# Patient Record
Sex: MALE | Race: WHITE | Employment: OTHER | URBAN - METROPOLITAN AREA
[De-identification: names, ages, dates, MRNs, and addresses within clinical notes are randomized per-mention and may not be internally consistent; named-entity substitution may affect disease eponyms.]

---

## 2017-02-17 ENCOUNTER — DOCUMENTATION ONLY (OUTPATIENT)
Dept: VASCULAR SURGERY | Facility: CLINIC | Age: 67
End: 2017-02-17

## 2017-06-05 ENCOUNTER — TELEPHONE (OUTPATIENT)
Dept: FAMILY MEDICINE | Facility: CLINIC | Age: 67
End: 2017-06-05

## 2017-06-05 DIAGNOSIS — J44.9 CHRONIC OBSTRUCTIVE PULMONARY DISEASE, UNSPECIFIED COPD TYPE (H): ICD-10-CM

## 2017-06-05 NOTE — TELEPHONE ENCOUNTER
[facility] Family Medicine phone call message- general phone call:    Reason for call: Patient needs new RX for a nebulizer machine. Please advise.    Return call needed: Yes    OK to leave a message on voice mail? Yes    Primary language: English      needed? No    Call taken on June 5, 2017 at 10:08 AM by Lisest Turcios

## 2017-06-12 ENCOUNTER — MEDICAL CORRESPONDENCE (OUTPATIENT)
Dept: HEALTH INFORMATION MANAGEMENT | Facility: CLINIC | Age: 67
End: 2017-06-12

## 2017-07-07 DIAGNOSIS — J44.1 COPD EXACERBATION (H): ICD-10-CM

## 2017-07-11 DIAGNOSIS — J44.1 COPD EXACERBATION (H): ICD-10-CM

## 2017-07-11 RX ORDER — PREDNISONE 20 MG/1
40 TABLET ORAL DAILY
Qty: 20 TABLET | Refills: 1 | Status: SHIPPED | OUTPATIENT
Start: 2017-07-11 | End: 2020-01-23

## 2017-07-13 RX ORDER — PREDNISONE 20 MG/1
40 TABLET ORAL DAILY
Qty: 20 TABLET | Refills: 3 | Status: SHIPPED | OUTPATIENT
Start: 2017-07-13 | End: 2018-08-15

## 2017-07-26 DIAGNOSIS — J43.9 PULMONARY EMPHYSEMA, UNSPECIFIED EMPHYSEMA TYPE (H): ICD-10-CM

## 2017-07-26 RX ORDER — IPRATROPIUM BROMIDE AND ALBUTEROL SULFATE 2.5; .5 MG/3ML; MG/3ML
3 SOLUTION RESPIRATORY (INHALATION) EVERY 4 HOURS PRN
Qty: 360 ML | Refills: 11 | Status: SHIPPED | OUTPATIENT
Start: 2017-07-26 | End: 2018-07-16

## 2017-09-06 DIAGNOSIS — J44.9 CHRONIC OBSTRUCTIVE PULMONARY DISEASE, UNSPECIFIED COPD TYPE (H): ICD-10-CM

## 2017-09-06 RX ORDER — PREDNISONE 10 MG/1
10 TABLET ORAL DAILY
Qty: 90 TABLET | Refills: 0 | Status: SHIPPED | OUTPATIENT
Start: 2017-09-06 | End: 2017-12-04

## 2017-09-29 ENCOUNTER — OFFICE VISIT (OUTPATIENT)
Dept: FAMILY MEDICINE | Facility: CLINIC | Age: 67
End: 2017-09-29

## 2017-09-29 VITALS
TEMPERATURE: 97.5 F | DIASTOLIC BLOOD PRESSURE: 78 MMHG | OXYGEN SATURATION: 97 % | BODY MASS INDEX: 27.59 KG/M2 | SYSTOLIC BLOOD PRESSURE: 155 MMHG | HEART RATE: 98 BPM | WEIGHT: 186.8 LBS

## 2017-09-29 DIAGNOSIS — I45.81 LONG QT SYNDROME CAUSED BY DRUG: Primary | ICD-10-CM

## 2017-09-29 DIAGNOSIS — J44.9 CHRONIC OBSTRUCTIVE PULMONARY DISEASE, UNSPECIFIED COPD TYPE (H): ICD-10-CM

## 2017-09-29 DIAGNOSIS — T50.905A LONG QT SYNDROME CAUSED BY DRUG: Primary | ICD-10-CM

## 2017-09-29 RX ORDER — AZITHROMYCIN 250 MG/1
TABLET, FILM COATED ORAL
Qty: 30 TABLET | Refills: 11 | Status: CANCELLED | OUTPATIENT
Start: 2017-09-29

## 2017-09-29 RX ORDER — FLUTICASONE PROPIONATE AND SALMETEROL 232; 14 UG/1; UG/1
1 POWDER, METERED RESPIRATORY (INHALATION) 2 TIMES DAILY
Qty: 3 EACH | Refills: 3 | Status: SHIPPED | OUTPATIENT
Start: 2017-09-29 | End: 2017-10-03

## 2017-09-29 ASSESSMENT — PATIENT HEALTH QUESTIONNAIRE - PHQ9
SUM OF ALL RESPONSES TO PHQ QUESTIONS 1-9: 7
5. POOR APPETITE OR OVEREATING: SEVERAL DAYS

## 2017-09-29 ASSESSMENT — ANXIETY QUESTIONNAIRES
3. WORRYING TOO MUCH ABOUT DIFFERENT THINGS: NOT AT ALL
5. BEING SO RESTLESS THAT IT IS HARD TO SIT STILL: SEVERAL DAYS
6. BECOMING EASILY ANNOYED OR IRRITABLE: SEVERAL DAYS
7. FEELING AFRAID AS IF SOMETHING AWFUL MIGHT HAPPEN: NOT AT ALL
GAD7 TOTAL SCORE: 4
1. FEELING NERVOUS, ANXIOUS, OR ON EDGE: SEVERAL DAYS
2. NOT BEING ABLE TO STOP OR CONTROL WORRYING: NOT AT ALL
IF YOU CHECKED OFF ANY PROBLEMS ON THIS QUESTIONNAIRE, HOW DIFFICULT HAVE THESE PROBLEMS MADE IT FOR YOU TO DO YOUR WORK, TAKE CARE OF THINGS AT HOME, OR GET ALONG WITH OTHER PEOPLE: SOMEWHAT DIFFICULT

## 2017-09-29 NOTE — PROGRESS NOTES
Patient Active Problem List    Diagnosis Date Noted     Alcohol abuse, in remission 02/15/2013     Priority: Medium     Dry since 2008       Allergic rhinitis 02/15/2013     Priority: Medium     Problem list name updated by automated process. 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  Chief Complaint   Patient presents with     Medication Follow-up     Medication follow up      Blood pressure 155/78, pulse 98, temperature 97.5  F (36.4  C), temperature source Oral, weight 186 lb 12.8 oz (84.7 kg), SpO2 97 %.  Results for orders placed or performed in visit on 07/26/16   [facility] Notification    Narrative    Former smoker; severe COPD, memory loss-not appropriate to screen     SUBJECTIVE:  Mikhail Bernard is here for his COPD.  He has severe COPD with an FEV1 less than 1.  He isn't on chronic oxygen therapy.  He is an ex-smoker.  On a good day he can walk a block; on a bad day he has frequent symptoms.  He is on DuoNebs up to 5 times day and Advair, which he has a hard time affording.  He doesn't smoke.  He has had four exacerbations, but they were probably mild and managed with oral steroids.  He has a supply at home that he uses on a p.r.n. basis.  He is on daily dose of 10 mg daily which he tolerates well with the exception of bruising.  His mood is good.  He is able to attend to his ADLs.  He gets help from family with shopping and laundry.   OBJECTIVE:     GENERAL:  Patient is alert, pleasant, and in no acute distress.     RESPIRATORY:  He has decreased lung sounds at the bases.    HEART:  Heart sounds are distant but regular.   EXTREMITIES:  No edema.   ASSESSMENT/PLAN:   1.  Severe COPD.   I didn't prescribe LAMA drug because of his current high daily doses of DuoNebs.  We considered Zithromax as daily prophylaxis today, but he still has a prolonged QT already, so it was contraindicated.  We'll essentially not change therapy.  I did switch him to a generic inhaled corticosteroid/LABA.  He'll follow up annually.  We'll refill his medications over the phone p.r.n.

## 2017-09-29 NOTE — PROGRESS NOTES
Inhaler Education Note                                                       Mikhail was referred to me by Dr. Metz for inhaler education    Subjective  Current Rescue Medication(s):  DuoNebs  DuoNebs: Use: 1 nebulizer treatment 6 times per day    Current Controller Medication(s):AirDou 232-14 mcg/act : Use: 1 puff 2 times per day    Smoker:  No.  Former    Objective    Is PCV13 indicated? No 11/27/15  Is PPSV23 indicated?Yes, last dose was in 2008    Assessment    Inhaler Technique  Type of Inhaler:  AirDuo RespiClick  Baseline inhaler technique: none  Educated pt on correct inhaler technique.  After 2 attempts, patient was satisfactorily able to use AirDuo RespiClick inhaler.  Required holding chamber: No  Required holding chamber with mask: No    Assessment/Plan  Provided COPD education.  Reviewed indication, effectiveness, and side effects of medications.  Reviewed directions for use and use of rescue vs controller inhaler medications.  Reviewed priming of inhalers and how to tell when inhaler is empty.      Discussed adherence of controller medication: Yes    Updated pulmonary medication list in EMR; deleted meds patient no longer taking, added meds patient is taking, and changed doses where there was a dose discrepancy.  All pulmonary medications were reviewed and found to be indicated, effective, safe and convenient/ affordable unless drug therapy problem(s) was/were identified, as are described below.        Plan  1. Educated on use for AirDou RespiClick inhaler  2. Asthma action plan, written, printed, and explained to patient.  3. Recommend PPSV23 vaccination at next visit      Drug therapy problems identified:  1) Med: AirDuo Respiclick - Convenience - patient unsure how to use  - Resolution: Med education;  resolved  2) Med: PPSV23 - Indication - Needs additional therapy - Resolution: recommend for future visit;  recommend    Options for treatment and/or follow-up care were reviewed with the patient.  Mikhail Bernard was engaged and actively involved in the decision making process, verbalized understanding of the options discussed and was satisfied with the final plan.  Patient was provided with written instructions/medication list via the AVS.  Patient should follow up in Dr. Metz.    Dr. Metz was provided our recommendations via routed note, was the prescribing physician per collaborative practice agreement, and available in clinic for precepting if needed.     Dinorah Guajardo, Pharm.D student    # of medical conditions addressed: 1  # of medications addressed: 1  # of DTP identified: 2  Time spent: 15 minutes  Level of service:1nc    The student acted as scribe and the encounter documented was completely performed by myself. I have reviewed and verified the student s documentation and found it to be correct and complete.  Odalis Javier, Pharm.D.

## 2017-09-29 NOTE — MR AVS SNAPSHOT
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    -  1       Follow-ups after your visit        Who to contact     Please call your clinic at [phone] to:    Ask questions about your health    Make or cancel appointments    Discuss your medicines    Learn about your test results    Speak to your doctor   If you have compliments or concerns about an experience at your clinic, or if you wish to file a complaint, please contact [facility] Patient Relations at [phone] or email us at [email or web address]         Additional Information About Your Visit        MyChart Information     Plexxi is an electronic gateway that provides easy, online access to your medical records. With Plexxi, you can request a clinic appointment, read your test results, renew a prescription or communicate with your care team.     To sign up for [employer] visit the website at [email or web address]   You will be asked to enter the access code listed below, as well as some personal information. Please follow the directions to create your username and password.     Your access code is: [email or web address]  Expires: 2017  3:08 PM     Your access code will  in 90 days. If you need help or a new code, please contact your [facility] Clinic or call [phone] for assistance.        Care EveryWhere ID     This is your Care EveryWhere ID. This could be used by other organizations to access your [place] medical records  [id number]        Your Vitals Were     Pulse Temperature Pulse Oximetry BMI (Body Mass Index)          98 97.5  F (36.4  C) (Oral) 97% 27.59 kg/m2         Blood Pressure from Last 3  Encounters:   09/29/17 155/78   06/24/15 (!) 153/95   06/26/13 119/75    Weight from Last 3 Encounters:   09/29/17 186 lb 12.8 oz (84.7 kg)   06/24/15 182 lb (82.6 kg)   06/26/13 168 lb 3.2 oz (76.3 kg)              Today, you had the following     No orders found for display         Today's Medication Changes          These changes are accurate as of: 9/29/17  3:08 PM.  If you have any questions, ask your nurse or doctor.               Stop taking these medicines if you haven't already. Please contact your care team if you have questions.     escitalopram 20 MG tablet   Commonly known as:  LEXAPRO   Stopped by:  Erwin Metz MD           tiotropium 18 MCG capsule   Commonly known as:  SPIRIVA HANDIHALER   Stopped by:  Erwin Metz MD                    Primary Care Provider Office Phone # Fax #    Erwin Metz [phone]       [place] [address]        Equal Access to Services     [facility]: Hadii aad ku hadasho Soomaali, waaxda luqadaha, qaybta kaalmada adeegyada, waxay idiin hayramiron joe reyez . So [facility] [phone].    ATENCIÓN: Si habla español, tiene a munson disposición servicios gratuitos de asistencia lingüística. Llame al [phone].    We comply with applicable federal civil rights laws and [place] laws. We do not discriminate on the basis of race, color, national origin, age, disability, sex, sexual orientation, or gender identity.            Thank you!     Thank you for choosing [facility]  for your care. Our goal is always to provide you with excellent care. Hearing back from our patients is one way we can continue to improve our services. Please take a few minutes to complete the written survey that you may receive in the mail after your visit with us. Thank you!             Your Updated Medication List - Protect others around you: Learn how to safely use, store and throw away your medicines at  www.disposemymeds.org.          This list is accurate as of: 9/29/17  3:08 PM.  Always use your most recent med list.                   Brand Name Dispense Instructions for use Diagnosis    albuterol 108 (90 BASE) MCG/ACT Inhaler    PROAIR HFA    2 Inhaler    Inhale 2 puffs into the lungs every 4 hours as needed.    COPD (chronic obstructive pulmonary disease) (H)       BENADRYL PO           calcium carbonate 500 MG Chew      Take 1 tablet by mouth 2 times daily.        CENTRUM SILVER per tablet      Take 1 tablet by mouth daily        cholecalciferol 1000 UNIT tablet    vitamin D     Take 1 tablet by mouth daily.        fluticasone-salmeterol 500-50 MCG/DOSE diskus inhaler    ADVAIR DISKUS    3 Inhaler    Inhale 1 puff into the lungs every 12 hours    Pulmonary emphysema, unspecified emphysema type (H)       ipratropium - albuterol 0.5 mg/2.5 mg/3 mL 0.5-2.5 (3) MG/3ML neb solution    DUONEB    360 mL    Take 1 vial (3 mLs) by nebulization every 4 hours as needed for shortness of breath / dyspnea 3 ml via neb every 4-6 hours PRN wheezing    Pulmonary emphysema, unspecified emphysema type (H)       order for DME     1 Device    Equipment being ordered: Nebulizer with appropriate tubing/mouthpiece or mask    Chronic obstructive pulmonary disease, unspecified COPD type (H)       * predniSONE 20 MG tablet    DELTASONE    20 tablet    Take 2 tablets (40 mg) by mouth daily For 5 days as needed for severe copd flare    COPD exacerbation (H)       * predniSONE 20 MG tablet    DELTASONE    20 tablet    Take 2 tablets (40 mg) by mouth daily For 5 days as needed for severe copd flare    COPD exacerbation (H)       * predniSONE 10 MG tablet    DELTASONE    90 tablet    Take 1 tablet (10 mg) by mouth daily    Chronic obstructive pulmonary disease, unspecified COPD type (H)       * Notice:  This list has 3 medication(s) that are the same as other medications prescribed for you. Read the directions carefully, and ask your doctor  or other care provider to review them with you.

## 2017-09-30 ASSESSMENT — ANXIETY QUESTIONNAIRES: GAD7 TOTAL SCORE: 4

## 2017-10-03 DIAGNOSIS — J44.9 CHRONIC OBSTRUCTIVE PULMONARY DISEASE, UNSPECIFIED COPD TYPE (H): Primary | ICD-10-CM

## 2017-10-03 RX ORDER — FLUTICASONE PROPIONATE AND SALMETEROL XINAFOATE 230; 21 UG/1; UG/1
2 AEROSOL, METERED RESPIRATORY (INHALATION) 2 TIMES DAILY
Qty: 36 G | Refills: 3 | Status: SHIPPED | OUTPATIENT
Start: 2017-10-03 | End: 2018-10-05

## 2017-10-03 NOTE — PROGRESS NOTES
PHARMACY NOTE    Received notification that generic fluticasone-salmeterol was not covered. Called insurance company to see which was covered.  They stated that brand name fluticasone-salmeterol (Advair HFA) was covered.  Sent Rx for Advair HFA with a note for the pharmacist to educate pt.    Anne Barnes, Pharm.D.

## 2017-12-04 DIAGNOSIS — J44.9 CHRONIC OBSTRUCTIVE PULMONARY DISEASE, UNSPECIFIED COPD TYPE (H): ICD-10-CM

## 2017-12-04 RX ORDER — PREDNISONE 10 MG/1
10 TABLET ORAL DAILY
Qty: 90 TABLET | Refills: 1 | Status: SHIPPED | OUTPATIENT
Start: 2017-12-04 | End: 2018-06-07

## 2018-02-06 ENCOUNTER — TELEPHONE (OUTPATIENT)
Dept: FAMILY MEDICINE | Facility: CLINIC | Age: 68
End: 2018-02-06

## 2018-02-06 NOTE — TELEPHONE ENCOUNTER
[facility] Family Medicine phone call message- general phone call:    Reason for call: the pt called just to let the Dr know he was in [facility] but is out now    Return call needed: Yes    OK to leave a message on voice mail? Yes    Primary language: English      needed? No    Call taken on February 6, 2018 at 11:58 AM by Driss Salinas

## 2018-02-16 ENCOUNTER — TELEPHONE (OUTPATIENT)
Dept: FAMILY MEDICINE | Facility: CLINIC | Age: 68
End: 2018-02-16

## 2018-02-16 DIAGNOSIS — J44.9 CHRONIC OBSTRUCTIVE PULMONARY DISEASE, UNSPECIFIED COPD TYPE (H): Primary | ICD-10-CM

## 2018-02-16 NOTE — TELEPHONE ENCOUNTER
[facility] Family Medicine phone call message- general phone call:    Reason for call: He would like to get a rescue inhaler  prescribed to him.Please give a call back.    Return call needed: Yes    OK to leave a message on voice mail? Yes    Primary language: English      needed? No    Call taken on February 16, 2018 at 10:27 AM by Jennifer Dash

## 2018-02-16 NOTE — TELEPHONE ENCOUNTER
Pt states he was admitted in Jan for COPD exacerbation and pneumonia. [place] [facility] He was discharged with a rescue inhaler (ventolin). Has about 20 puffs left and would like Dr. Metz to send him another one to [facility]. Pt hasn't been using often, but would like to have one available. Otherwise pt states his breathing is back to normal. Advised to schedule f/u up, pt states it's hard for him to come in. Pt would like ventolin refill for now. Routed to Dr. Metz. /ROBERT Tillman

## 2018-02-18 RX ORDER — ALBUTEROL SULFATE 90 UG/1
2 AEROSOL, METERED RESPIRATORY (INHALATION) EVERY 4 HOURS PRN
Qty: 2 INHALER | Refills: 11 | Status: SHIPPED | OUTPATIENT
Start: 2018-02-18 | End: 2019-03-21

## 2018-06-07 DIAGNOSIS — J44.9 CHRONIC OBSTRUCTIVE PULMONARY DISEASE, UNSPECIFIED COPD TYPE (H): ICD-10-CM

## 2018-06-07 RX ORDER — PREDNISONE 10 MG/1
10 TABLET ORAL DAILY
Qty: 90 TABLET | Refills: 1 | Status: SHIPPED | OUTPATIENT
Start: 2018-06-07 | End: 2018-06-11

## 2018-06-11 DIAGNOSIS — J44.9 CHRONIC OBSTRUCTIVE PULMONARY DISEASE, UNSPECIFIED COPD TYPE (H): ICD-10-CM

## 2018-06-11 RX ORDER — PREDNISONE 10 MG/1
10 TABLET ORAL DAILY
Qty: 90 TABLET | Refills: 1 | Status: SHIPPED | OUTPATIENT
Start: 2018-06-11 | End: 2019-04-19

## 2018-07-16 DIAGNOSIS — J43.9 PULMONARY EMPHYSEMA, UNSPECIFIED EMPHYSEMA TYPE (H): ICD-10-CM

## 2018-07-16 RX ORDER — IPRATROPIUM BROMIDE AND ALBUTEROL SULFATE 2.5; .5 MG/3ML; MG/3ML
3 SOLUTION RESPIRATORY (INHALATION) EVERY 4 HOURS PRN
Qty: 360 ML | Refills: 11 | Status: SHIPPED | OUTPATIENT
Start: 2018-07-16 | End: 2018-07-19

## 2018-07-19 DIAGNOSIS — J43.9 PULMONARY EMPHYSEMA, UNSPECIFIED EMPHYSEMA TYPE (H): ICD-10-CM

## 2018-07-19 RX ORDER — IPRATROPIUM BROMIDE AND ALBUTEROL SULFATE 2.5; .5 MG/3ML; MG/3ML
3 SOLUTION RESPIRATORY (INHALATION) EVERY 4 HOURS PRN
Qty: 360 ML | Refills: 11 | Status: SHIPPED | OUTPATIENT
Start: 2018-07-19 | End: 2019-07-29

## 2018-07-27 ENCOUNTER — MEDICAL CORRESPONDENCE (OUTPATIENT)
Dept: HEALTH INFORMATION MANAGEMENT | Facility: CLINIC | Age: 68
End: 2018-07-27

## 2018-08-14 DIAGNOSIS — J44.1 COPD EXACERBATION (H): ICD-10-CM

## 2018-08-15 DIAGNOSIS — J44.1 COPD EXACERBATION (H): ICD-10-CM

## 2018-08-15 RX ORDER — PREDNISONE 20 MG/1
40 TABLET ORAL DAILY
Qty: 20 TABLET | Refills: 3 | Status: SHIPPED | OUTPATIENT
Start: 2018-08-15 | End: 2020-01-23

## 2018-08-15 RX ORDER — PREDNISONE 20 MG/1
40 TABLET ORAL DAILY
Qty: 20 TABLET | Refills: 3 | Status: SHIPPED | OUTPATIENT
Start: 2018-08-15 | End: 2018-08-15

## 2018-10-05 ENCOUNTER — TELEPHONE (OUTPATIENT)
Dept: FAMILY MEDICINE | Facility: CLINIC | Age: 68
End: 2018-10-05

## 2018-10-05 DIAGNOSIS — J44.9 CHRONIC OBSTRUCTIVE PULMONARY DISEASE, UNSPECIFIED COPD TYPE (H): ICD-10-CM

## 2018-10-05 RX ORDER — FLUTICASONE PROPIONATE AND SALMETEROL XINAFOATE 230; 21 UG/1; UG/1
2 AEROSOL, METERED RESPIRATORY (INHALATION) 2 TIMES DAILY
Qty: 36 G | Refills: 3 | Status: SHIPPED | OUTPATIENT
Start: 2018-10-05 | End: 2018-10-05

## 2018-10-05 RX ORDER — FLUTICASONE PROPIONATE AND SALMETEROL XINAFOATE 230; 21 UG/1; UG/1
2 AEROSOL, METERED RESPIRATORY (INHALATION) 2 TIMES DAILY
Qty: 36 G | Refills: 3 | Status: SHIPPED | OUTPATIENT
Start: 2018-10-05 | End: 2019-11-05

## 2018-10-05 NOTE — TELEPHONE ENCOUNTER
[facility] Family Medicine phone call message- patient requesting a refill:    Full Medication Name: fluticasone-salmeterol (ADVAIR-HFA) 230-21 MCG/ACT inhaler    Dose: Inhale 2 puffs into the lungs 2 times daily - Inhalation    Pharmacy confirmed as   [id number] - [facility] [address] [facility]  [address] [facility] [address]  Phone: [phone] Fax: [phone]  : Yes    Additional Comments: Pt requested the wrong one from the pharmacy on line.  I guess [facility] sent a request last night to us.  He said he will need this today prior to the weekend.     OK to leave a message on voice mail? Yes    Primary language: English      needed? No    Call taken on October 5, 2018 at 10:55 AM by Norman Lino

## 2018-11-07 NOTE — PROGRESS NOTES
EKG Interpretation  Indication:r/o prolonged qt    Interpretation: Normal Sinus Rhythm, rate normal.  qtc 499 (prolonged) and left axis deivation    Patient informed at visit.   75

## 2018-12-16 ENCOUNTER — OFFICE VISIT (OUTPATIENT)
Dept: URGENT CARE | Facility: URGENT CARE | Age: 68
End: 2018-12-16
Payer: MEDICARE

## 2018-12-16 ENCOUNTER — ANCILLARY PROCEDURE (OUTPATIENT)
Dept: GENERAL RADIOLOGY | Facility: CLINIC | Age: 68
End: 2018-12-16
Attending: FAMILY MEDICINE
Payer: MEDICARE

## 2018-12-16 VITALS
SYSTOLIC BLOOD PRESSURE: 142 MMHG | HEART RATE: 101 BPM | DIASTOLIC BLOOD PRESSURE: 80 MMHG | BODY MASS INDEX: 28.09 KG/M2 | TEMPERATURE: 98.1 F | WEIGHT: 190.2 LBS | OXYGEN SATURATION: 94 %

## 2018-12-16 DIAGNOSIS — M79.672 LEFT FOOT PAIN: ICD-10-CM

## 2018-12-16 DIAGNOSIS — S62.357A CLOSED NONDISPLACED FRACTURE OF SHAFT OF FIFTH METACARPAL BONE OF LEFT HAND, INITIAL ENCOUNTER: Primary | ICD-10-CM

## 2018-12-16 PROCEDURE — 73630 X-RAY EXAM OF FOOT: CPT | Mod: LT

## 2018-12-16 PROCEDURE — 99213 OFFICE O/P EST LOW 20 MIN: CPT | Performed by: FAMILY MEDICINE

## 2018-12-16 NOTE — PROGRESS NOTES
SUBJECTIVE:  Chief Complaint   Patient presents with     Musculoskeletal Problem     PT presents with left foot pain mainly on the pinky toe side., while pivoting heard a crack, radiated up his leg, no swelling redness, or bruising . Hurts to the touch . If he doesn't move he does not feel the pain     Mikhail Bernard is a 68 year old male who presents with a chief complaint of left foot pain.  Symptoms began 1 day(s) ago, are moderate and sudden onset  Context:  Injury:Yes: stepped funny in the kitchen.  Pain exacerbated by weight-bearing Relieved by rest and elevation.  He treated it initially with naprosyn. This is the first time this type of injury has occurred to this patient.     No past medical history on file.  Current Outpatient Medications   Medication Sig Dispense Refill     albuterol (PROAIR HFA/PROVENTIL HFA/VENTOLIN HFA) 108 (90 BASE) MCG/ACT Inhaler Inhale 2 puffs into the lungs every 4 hours as needed for shortness of breath / dyspnea or wheezing 2 Inhaler 11     cholecalciferol (VITAMIN D3) 1000 UNIT tablet Take 1 tablet by mouth daily.       DiphenhydrAMINE HCl (BENADRYL PO)        fluticasone-salmeterol (ADVAIR-HFA) 230-21 MCG/ACT inhaler Inhale 2 puffs into the lungs 2 times daily 36 g 3     ipratropium - albuterol 0.5 mg/2.5 mg/3 mL (DUONEB) 0.5-2.5 (3) MG/3ML neb solution Take 1 vial (3 mLs) by nebulization every 4 hours as needed for shortness of breath / dyspnea 3 ml via neb every 4-6 hours PRN wheezing 360 mL 11     order for DME Equipment being ordered: Nebulizer with appropriate tubing/mouthpiece or mask 1 Device 0     predniSONE (DELTASONE) 10 MG tablet Take 1 tablet (10 mg) by mouth daily 90 tablet 1     predniSONE (DELTASONE) 20 MG tablet Take 2 tablets (40 mg) by mouth daily For 5 days as needed for severe copd flare 20 tablet 3     predniSONE (DELTASONE) 20 MG tablet Take 2 tablets (40 mg) by mouth daily For 5 days as needed for severe copd flare 20 tablet 1     calcium carbonate 500 MG  CHEW Take 1 tablet by mouth 2 times daily.       Multiple Vitamins-Minerals (CENTRUM SILVER) per tablet Take 1 tablet by mouth daily       Social History     Tobacco Use     Smoking status: Former Smoker     Types: Cigarettes     Smokeless tobacco: Never Used   Substance Use Topics     Alcohol use: No       ROS:  CV: NEGATIVE for chest pain, palpitations or peripheral edema  NEURO: NEGATIVE for weakness, dizziness or paresthesias    EXAM:   /80 (BP Location: Right arm, Patient Position: Chair)   Pulse 101   Temp 98.1  F (36.7  C) (Oral)   Wt 86.3 kg (190 lb 3.2 oz)   SpO2 94%   BMI 28.09 kg/m    M/S Exam:L foot swelling and tenderness to palpationover the 5th metatarsal with some bruising  GENERAL APPEARANCE: healthy, alert and no distress  EXTREMITIES: peripheral pulses normal  SKIN: no suspicious lesions or rashes  NEURO: Normal strength and tone, sensory exam grossly normal, mentation intact and speech normal    X-RAY was done.   When I review it personally there is a non discplaced fracture noted in the shaft of the   5th metatarsal       ASSESSMENT:  1. Closed nondisplaced fracture of shaft of fifth metacarpal bone of left hand, initial encounter  Radiology to review xrays and I will communicate new findings      - XR Foot Left G/E 3 Views; Future  - PODIATRY/FOOT & ANKLE SURGERY REFERRAL  - order for DME; Equipment being ordered: crutches and walking boot  Dispense: 1 each; Refill: 0

## 2019-03-20 DIAGNOSIS — J44.9 CHRONIC OBSTRUCTIVE PULMONARY DISEASE, UNSPECIFIED COPD TYPE (H): ICD-10-CM

## 2019-03-21 RX ORDER — ALBUTEROL SULFATE 90 UG/1
2 AEROSOL, METERED RESPIRATORY (INHALATION) EVERY 4 HOURS PRN
Qty: 36 G | Refills: 11 | Status: SHIPPED | OUTPATIENT
Start: 2019-03-21 | End: 2020-04-01

## 2019-04-19 DIAGNOSIS — J44.9 CHRONIC OBSTRUCTIVE PULMONARY DISEASE, UNSPECIFIED COPD TYPE (H): ICD-10-CM

## 2019-04-19 RX ORDER — PREDNISONE 10 MG/1
10 TABLET ORAL DAILY
Qty: 90 TABLET | Refills: 0 | Status: SHIPPED | OUTPATIENT
Start: 2019-04-19 | End: 2019-07-11

## 2019-06-24 ENCOUNTER — OFFICE VISIT (OUTPATIENT)
Dept: FAMILY MEDICINE | Facility: CLINIC | Age: 69
End: 2019-06-24
Payer: COMMERCIAL

## 2019-06-24 VITALS
OXYGEN SATURATION: 98 % | TEMPERATURE: 97.8 F | BODY MASS INDEX: 28.44 KG/M2 | DIASTOLIC BLOOD PRESSURE: 88 MMHG | SYSTOLIC BLOOD PRESSURE: 145 MMHG | RESPIRATION RATE: 16 BRPM | HEART RATE: 86 BPM | WEIGHT: 192.6 LBS

## 2019-06-24 DIAGNOSIS — M25.552 HIP PAIN, LEFT: Primary | ICD-10-CM

## 2019-06-24 RX ORDER — TRAMADOL HYDROCHLORIDE 50 MG/1
TABLET ORAL
Qty: 30 TABLET | Refills: 0 | Status: SHIPPED | OUTPATIENT
Start: 2019-06-24 | End: 2019-07-11

## 2019-06-24 NOTE — PATIENT INSTRUCTIONS
PHYSICAL THERAPY REFERRAL   June 24, 2019 Demographics and referral for Physical Therapy faxed to [facility] at [phone]. [facility]  Phone: [phone]  Fax: [phone]  Scheduling Hours: Monday - Friday, 7 am to 4:30 pm    [facility]  [address] [place] [address]    [facility], located within [facility]  [facility]   [address], [address]  [place] [address]    [facility]  [address] [place] [address]    [facility]  [address], [address]  [place] [address]    [facility]  [address] [place] [address]

## 2019-06-24 NOTE — LETTER
July 1, 2019      Mikhail Bernard  [address]    [facility] [address]        Dear Mikhail,    Please see below for your test results.    Hi.  I am covering for Dr. Metz. Your xrays show severe arthritis in the left hip.   Please follow up with Dr. Metz as indicated.     Attached to this letter you'll find the radiology report.     If you have any questions, please call the clinic to make an appointment.    Sincerely,    TERRENCE GARCIA MD

## 2019-06-24 NOTE — PROGRESS NOTES
Patient Active Problem List    Diagnosis Date Noted     Alcohol abuse, in remission 02/15/2013     Priority: Medium     Dry since 2008       Allergic rhinitis 02/15/2013     Priority: Medium     Problem list name updated by automated process. 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  Chief Complaint   Patient presents with     RECHECK     follow up hip pain on left side     Blood pressure 145/88, pulse 86, temperature 97.8  F (36.6  C), temperature source Oral, resp. rate 16, weight 87.4 kg (192 lb 9.6 oz), SpO2 98 %.     SUBJECTIVE: Mikhail Bernard is here for severe left hip pain that has been present for six months.  No history of trauma.  He has a family history of OA.  The pain isn't responsive to OTC Tylenol or Aleve, up to 6-8 tablets per day.     He has severe steroid-dependent COPD; please see prior spirometry.  He has done quite well on chronic prednisone 10 mg daily.  He has had one hospitalization since 01/2018.  He gets some help from his sister.   The hip pain is laterally located.  He has also topical Biofreeze (which I think is menthol) and that hasn't helped much either.      OBJECTIVE:   GENERAL:  Patient is alert, pleasant, and nontoxic.   VITAL SIGNS:  As listed.  He isn't on oxygen.   MSK:  Examination of his back and pelvis reveals no tenderness posteriorly.  He has no tenderness over his greater trochanter.  It is mildly tender over the groin.  He has marked limitation in ROM, both in internal and external rotation.  This produces fairly substantial pain on the left.   His right hip is relatively normal.  X-rays of his pelvis show appears to be sclerosis and collapse of the femoral head.      ASSESSMENT/PLAN:   1.  Left hip pain.  This is probably from osteonecrosis.  We'll await a formal reading.  Thankfully, he has a cane, and we'll get him in to see Orthopedics fairly soon.  I prescribed tramadol to use on a p.r.n. basis to help with sleep.  Quantity of 30 was given.  He'll follow up here in a few weeks to see how things are going.  I do think that his surgical candidacy is at risk given his severe COPD, but it might be a reasonable option to consider.  Ortho referral given

## 2019-06-25 ENCOUNTER — AMBULATORY - HEALTHEAST (OUTPATIENT)
Dept: ADMINISTRATIVE | Facility: REHABILITATION | Age: 69
End: 2019-06-25

## 2019-06-28 NOTE — RESULT ENCOUNTER NOTE
Hi.  I am covering for Dr. Metz. Your xrays show severe arthritis in the left hip.   Please follow up with Dr. Metz as indicated.

## 2019-07-05 DIAGNOSIS — M25.552 HIP PAIN, LEFT: ICD-10-CM

## 2019-07-07 RX ORDER — TRAMADOL HYDROCHLORIDE 50 MG/1
TABLET ORAL
Qty: 30 TABLET | Refills: 0 | OUTPATIENT
Start: 2019-07-07

## 2019-07-10 DIAGNOSIS — J44.9 CHRONIC OBSTRUCTIVE PULMONARY DISEASE, UNSPECIFIED COPD TYPE (H): ICD-10-CM

## 2019-07-10 DIAGNOSIS — M25.552 HIP PAIN, LEFT: ICD-10-CM

## 2019-07-10 RX ORDER — PREDNISONE 10 MG/1
10 TABLET ORAL DAILY
Qty: 90 TABLET | Refills: 0 | Status: CANCELLED | OUTPATIENT
Start: 2019-07-10

## 2019-07-10 RX ORDER — TRAMADOL HYDROCHLORIDE 50 MG/1
TABLET ORAL
Qty: 30 TABLET | Refills: 0 | Status: CANCELLED | OUTPATIENT
Start: 2019-07-10

## 2019-07-10 NOTE — TELEPHONE ENCOUNTER
Pt states he has only taken half of tramadol because he feels like its too much. He does say the Tramadol is effective. He said he did not go to ortho yet. He has an appointment to see you on 7/11/2019

## 2019-07-11 ENCOUNTER — OFFICE VISIT (OUTPATIENT)
Dept: FAMILY MEDICINE | Facility: CLINIC | Age: 69
End: 2019-07-11
Payer: COMMERCIAL

## 2019-07-11 VITALS
WEIGHT: 191.2 LBS | TEMPERATURE: 97.8 F | RESPIRATION RATE: 16 BRPM | HEART RATE: 90 BPM | OXYGEN SATURATION: 94 % | BODY MASS INDEX: 28.24 KG/M2 | DIASTOLIC BLOOD PRESSURE: 78 MMHG | SYSTOLIC BLOOD PRESSURE: 135 MMHG

## 2019-07-11 DIAGNOSIS — J44.9 CHRONIC OBSTRUCTIVE PULMONARY DISEASE, UNSPECIFIED COPD TYPE (H): ICD-10-CM

## 2019-07-11 DIAGNOSIS — M25.552 HIP PAIN, LEFT: ICD-10-CM

## 2019-07-11 DIAGNOSIS — G89.4 CHRONIC PAIN SYNDROME: Primary | ICD-10-CM

## 2019-07-11 LAB
AMPHETAMINES QUAL: NEGATIVE
BARBITURATES QUAL URINE: NEGATIVE
BENZODIAZEPINE QUAL URINE: NEGATIVE
BUPRENORPHINE QUAL URINE: NEGATIVE
CANNABINOIDS UR QL SCN: NEGATIVE
COCAINE QUAL URINE: NEGATIVE
METHAMPHETAMINE: NEGATIVE
METHODONE QUAL: NEGATIVE
MORPHINE QUAL: NEGATIVE
OXYCODONE QUAL: NEGATIVE
PHENCYCLIDINE: NEGATIVE
PROPOXYPHENE: NEGATIVE
TEMPERATURE OF URINE WAS BETWEEN 90-100 DEGREES F: YES
TRICYCLIC ANTIDEPRESSANTS: NEGATIVE

## 2019-07-11 RX ORDER — PREDNISONE 10 MG/1
10 TABLET ORAL DAILY
Qty: 90 TABLET | Refills: 0 | Status: SHIPPED | OUTPATIENT
Start: 2019-07-11 | End: 2019-10-01

## 2019-07-11 RX ORDER — TRAMADOL HYDROCHLORIDE 50 MG/1
TABLET ORAL
Qty: 60 TABLET | Refills: 0 | Status: SHIPPED | OUTPATIENT
Start: 2019-07-11 | End: 2019-08-19

## 2019-07-25 ENCOUNTER — TELEPHONE (OUTPATIENT)
Dept: FAMILY MEDICINE | Facility: CLINIC | Age: 69
End: 2019-07-25

## 2019-07-25 NOTE — TELEPHONE ENCOUNTER
7/25/19 3:30 pm spoke to KIMBERLY who state they tried to contact the patient and he did not respond.  Stated that it is in his hand now to schedule appointment.  Called patient and relayed information.  Patient thankful and states he will call.  Juana Gross, [facility]

## 2019-07-25 NOTE — TELEPHONE ENCOUNTER
Brennen Brown is being seen as follow up.    PCP: Ignacio Reveles MD   Medication verified, no changes  Denies known Latex allergy or symptoms of Latex sensitivity  Tobacco history verified.  Okay to release results through [facility]:  no  Patient would like communication of their results via: [phone] cell   Verbal permission granted by patient to leave a detailed message with medical information on answering machine at phone number given? yes    If female, are you pregnant, trying to become pregnant, or breastfeeding? NA  Pacemaker: no  Defibrillator: no  Taking blood thinners: no  Allergy to lidocaine: no  Nurses notes reviewed and accepted.    CHIEF COMPLAINT:  Lesion (est. pt )    HISTORY OF PRESENT ILLNESS:  Brennen Brown is a 37 year old male of White ethnicity who presents for evaluation of lesion.  Location:  Left ear, right nostril   Duration:   months  Symptoms:  no associated symptoms  Changes over time:  growing in size to nose   Treatments:  None       ALLERGIES:  No Known Allergies    Current Outpatient Prescriptions   Medication Sig Dispense Refill   • Multiple Vitamins-Minerals (MULTIVITAL-M) TABS Take  by mouth.       No current facility-administered medications for this visit.        PHYSICAL EXAMINATION:    Wt Readings from Last 1 Encounters:   07/01/15 78.2 kg           Pelletier skin type: SKIN TYPE 2 (usually burns, sometimes tans)  Constitutional:  No acute distress, well nourished and well groomed  Neurologic: Alert & oriented to person, place and time, appropriate affect and mood and pleasant   Skin: Focused skin exam performed today of the face, neck, right upper extremity, left upper extremity.  Filiform verrucous papule on the right ala. Hemorrhagic crust left ear. Tan dome-shaped papule right superior forehead.    ASSESSMENT/PLAN:  Other viral warts, filiform on the right ala: Discussed with patient/family warts are a common viral infection of the skin.  There is no  [facility] phone call message- general phone call:    Reason for call: the Pt called to let the Dr know that he is still waiting for his referral and would like a call back. The Pt  States has called and left several messages and is upset that it has not been resolved .      Return call needed: Yes    OK to leave a message on voice mail? Yes    Primary language: English      needed? No    Call taken on July 25, 2019 at 2:25 PM by Driss Salinas     definitive therapy, but possible treatment options were discussed.  Decision made to treat with cryotherapy.  Possible side effects were reviewed.   - Destruction of Benign Lesions (1-14)    Benign neoplasm of skin of face on the right superior forehead: Benign appearing nevus. Reassurance provided.    Scab, left ear:  Easily removed with alcohol wipe. Reassurance provided.    Return in about 4 weeks (around 7/6/2017) for follow up wart right ala .     Diagnosis was discussed with patient.  When applicable, possible treatments, including medications were discussed, as well as alternative treatments.  Potential side effects were reviewed and patient voiced understanding.  All questions were answered.  I, Nayeli Abbasi MA, attest that I performed the duties of a scribe for this encounter, in the presence of Dr. Vasquez who personally saw and examined the patient.  I have reviewed the patient's history and personally performed the history of present illness, physical exam, clinical impressions and plan; also reviewed and verified the note scribed by my medical assistant. Ava Vasquez MD

## 2019-07-29 DIAGNOSIS — J43.9 PULMONARY EMPHYSEMA, UNSPECIFIED EMPHYSEMA TYPE (H): ICD-10-CM

## 2019-07-29 RX ORDER — IPRATROPIUM BROMIDE AND ALBUTEROL SULFATE 2.5; .5 MG/3ML; MG/3ML
3 SOLUTION RESPIRATORY (INHALATION) EVERY 4 HOURS PRN
Qty: 360 ML | Refills: 11 | Status: SHIPPED | OUTPATIENT
Start: 2019-07-29 | End: 2020-07-23

## 2019-08-19 ENCOUNTER — OFFICE VISIT (OUTPATIENT)
Dept: FAMILY MEDICINE | Facility: CLINIC | Age: 69
End: 2019-08-19
Payer: COMMERCIAL

## 2019-08-19 VITALS
SYSTOLIC BLOOD PRESSURE: 159 MMHG | WEIGHT: 189.6 LBS | OXYGEN SATURATION: 95 % | HEART RATE: 92 BPM | RESPIRATION RATE: 16 BRPM | BODY MASS INDEX: 28 KG/M2 | DIASTOLIC BLOOD PRESSURE: 88 MMHG | TEMPERATURE: 97.3 F

## 2019-08-19 DIAGNOSIS — R10.13 DYSPEPSIA: ICD-10-CM

## 2019-08-19 DIAGNOSIS — G89.4 CHRONIC PAIN SYNDROME: ICD-10-CM

## 2019-08-19 DIAGNOSIS — M25.552 HIP PAIN, LEFT: ICD-10-CM

## 2019-08-19 DIAGNOSIS — I10 ESSENTIAL HYPERTENSION: Primary | ICD-10-CM

## 2019-08-19 DIAGNOSIS — G89.4 CHRONIC PAIN SYNDROME: Primary | ICD-10-CM

## 2019-08-19 LAB
AMPHETAMINES QUAL: NEGATIVE
BARBITURATES QUAL URINE: NEGATIVE
BENZODIAZEPINE QUAL URINE: NEGATIVE
BUN SERPL-MCNC: 21.7 MG/DL (ref 7–21)
BUPRENORPHINE QUAL URINE: NEGATIVE
CALCIUM SERPL-MCNC: 9.7 MG/DL (ref 8.5–10.1)
CANNABINOIDS UR QL SCN: NEGATIVE
CHLORIDE SERPLBLD-SCNC: 105.6 MMOL/L (ref 98–110)
CO2 SERPL-SCNC: 27.1 MMOL/L (ref 20–32)
COCAINE QUAL URINE: NEGATIVE
CREAT SERPL-MCNC: 0.9 MG/DL (ref 0.7–1.3)
GFR SERPL CREATININE-BSD FRML MDRD: >90 ML/MIN/1.7 M2
GLUCOSE SERPL-MCNC: 117.9 MG'DL (ref 70–99)
METHAMPHETAMINE: NEGATIVE
METHODONE QUAL: NEGATIVE
MORPHINE QUAL: NEGATIVE
OXYCODONE QUAL: NEGATIVE
PHENCYCLIDINE: NEGATIVE
POTASSIUM SERPL-SCNC: 4.5 MMOL/DL (ref 3.2–4.6)
PROPOXYPHENE: NEGATIVE
SODIUM SERPL-SCNC: 141.7 MMOL/L (ref 132–142)
TEMPERATURE OF URINE WAS BETWEEN 90-100 DEGREES F: YES
TRICYCLIC ANTIDEPRESSANTS: NEGATIVE

## 2019-08-19 RX ORDER — CHLORTHALIDONE 25 MG/1
12.5 TABLET ORAL DAILY
Qty: 30 TABLET | Refills: 11 | Status: SHIPPED | OUTPATIENT
Start: 2019-08-19 | End: 2019-11-18 | Stop reason: ALTCHOICE

## 2019-08-19 RX ORDER — TRAMADOL HYDROCHLORIDE 50 MG/1
TABLET ORAL
Qty: 60 TABLET | Refills: 0 | Status: SHIPPED | OUTPATIENT
Start: 2019-08-19 | End: 2020-01-23

## 2019-08-19 NOTE — RESULT ENCOUNTER NOTE
Mr Bernard  Your blood work and kidney tests look fine.  Your blood sugar is slightly elevated, but not at the range of diabetes.    Your urine test was appropriate, as we discussed.  See you in September.  RAGHAVENDRA Metz

## 2019-08-19 NOTE — PROGRESS NOTES
SUBJECTIVE: Mikhail Bernard is here primary for follow up of left hip pain and medication refill. He was diagnosed with OA/osteonecrosis of the left femoral head last time. Today, he reports left hip pain that radiates to his left gluts and down to the ankle sometimes. Pain gets worse throughout the day and with exertion. Rates severity to be 4,5/10 normally and increases up to 9.5/10 when it gets worse. He denies any red flag sx, such as fever, weight loss, bowel/bladder incontinence, neurological sx, or saddle anesthesia. He has been using tramadol for pain management. Initial side effects of seeing something in his peripheral vision is gone, but dizziness still persists. He has met with his ortho referral and set a surgery date for hip arthroplasty on 9/18 and has a pre-op scheduled here on 9/09. Patient is worried about intrusiveness of procedure but hopeful of its effect on restoring his quality of life. His mood has been slightly depressed because he can't move around as much, but he is hopeful that he will be able to go back to normal after the surgery.  He still uses NSAIDs, Aleve, for pain. We prescribed him omeprazole to prevent any stomach pain due to NSAID use. He currently denies any stomach problems. Also, we addressed his high blood pressure and he was open to starting BP medications. He shared that he has family history of high BP.      OBJECTIVE:  VITALS: BP-158/98,159/88, Pulse-92, RR-16, SpO2-95%, Temp-97.3F, Weight 189.61lbs  GENERAL: Patient is alert, pleasant, and in no acute distress. He is not markedly depressed with normal range of affect.  EXTREMITIES: No bipedal edema detected  CHEST/LUNG: Normal chest and lung sounds. Decreased breath sounds towards base of lungs, but expected with his COPD. Heart has no S2, S3 abnormalities.      ASSESSMENT & PLAN:  1. Chronic hip pain secondary to potential osteonecrosis: Patient has been able to manage his pain with current tramadol dose. Plan is to refill  his medication and continue pain management until his surgery. He is using NSAIDs as well for pain relief and warrants continuing his omeprazole medications. Will follow up with patient during pre-op visit.    2. Potential essential hypertension: His BP was 158/98 and repeat was 159/88. His previous BP were also elevated. Suggested to initiate Chlorthalidone today @ 12.5 mg daily and run BMP labs to get base line for his kidney functions.  3.  Dyspepsia.  Stay on PPI while taking nsaid and steroids.    Chip Prince, MS-3    Preceptor Attestation:  I was present with the medical student who participated in the service and in the documentation of this note. I have verified the history and personally performed the physical exam and medical decision making. I have verified the content of the note, which accurately reflects my assessment of the patient and the plan of care.   Supervising Physician:  Eriwn Metz MD    Results for orders placed or performed in visit on 08/19/19   Rapid Urine Drug Screen ([facility])   Result Value Ref Range    Phencyclidine NEGATIVE NEGATIVE    Propoxyphene NEGATIVE NEGATIVE    Tricyclic Antidepressants NEGATIVE NEGATIVE    Amphetamines Qual NEGATIVE NEGATIVE    Barbiturates Qual Urine NEGATIVE NEGATIVE    Buprenorphine Qual Urine NEGATIVE NEGATIVE    Benzodiazepine Qual Urine NEGATIVE NEGATIVE    Cocaine Qual Urine NEGATIVE NEGATIVE    Cannabinoids Qual Urine NEGATIVE NEGATIVE    Methamphetamine Qual NEGATIVE NEGATIVE    Methadone Qual NEGATIVE NEGATIVE    Morphine Qual NEGATIVE NEGATIVE    Oxycodone Qual NEGATIVE NEGATIVE    Temperature of Urine was Between  Degrees F YES YES   Basic Metabolic Panel ([place])   Result Value Ref Range    Urea Nitrogen 21.7 (H) 7.0 - 21.0 mg/dL    Calcium 9.7 8.5 - 10.1 mg/dL    Chloride 105.6 98.0 - 110.0 mmol/L    Carbon Dioxide 27.1 20.0 - 32.0 mmol/L    Creatinine 0.9 0.7 - 1.3 mg/dL    Glucose 117.9 (H) 70.0 - 99.0 mg'dL    Potassium 4.5 3.2  - 4.6 mmol/dL    Sodium 141.7 132.0 - 142.0 mmol/L    GFR Estimate >90 >60.0 mL/min/1.7 m2    GFR Estimate If Black >90 >60.0 mL/min/1.7 m2

## 2019-09-09 ENCOUNTER — OFFICE VISIT (OUTPATIENT)
Dept: FAMILY MEDICINE | Facility: CLINIC | Age: 69
End: 2019-09-09
Payer: COMMERCIAL

## 2019-09-09 VITALS
OXYGEN SATURATION: 96 % | HEART RATE: 98 BPM | TEMPERATURE: 98.7 F | WEIGHT: 188.4 LBS | RESPIRATION RATE: 20 BRPM | SYSTOLIC BLOOD PRESSURE: 128 MMHG | BODY MASS INDEX: 27.82 KG/M2 | DIASTOLIC BLOOD PRESSURE: 81 MMHG

## 2019-09-09 DIAGNOSIS — T14.8XXA BRUISING: ICD-10-CM

## 2019-09-09 DIAGNOSIS — I10 ESSENTIAL HYPERTENSION: ICD-10-CM

## 2019-09-09 DIAGNOSIS — J44.9 CHRONIC OBSTRUCTIVE PULMONARY DISEASE, UNSPECIFIED COPD TYPE (H): Primary | ICD-10-CM

## 2019-09-09 DIAGNOSIS — Z01.818 PREOP GENERAL PHYSICAL EXAM: ICD-10-CM

## 2019-09-09 LAB
% GRANULOCYTES: 87.2 %G (ref 40–75)
BUN SERPL-MCNC: 19.5 MG/DL (ref 7–21)
CALCIUM SERPL-MCNC: 9.5 MG/DL (ref 8.5–10.1)
CHLORIDE SERPLBLD-SCNC: 97.7 MMOL/L (ref 98–110)
CO2 SERPL-SCNC: 32.8 MMOL/L (ref 20–32)
CREAT SERPL-MCNC: 0.8 MG/DL (ref 0.7–1.3)
GFR SERPL CREATININE-BSD FRML MDRD: >90 ML/MIN/1.7 M2
GLUCOSE SERPL-MCNC: 120.4 MG'DL (ref 70–99)
GRANULOCYTES #: 11.2 K/UL (ref 1.6–8.3)
HCT VFR BLD AUTO: 42.8 % (ref 40–53)
HEMOGLOBIN: 13.4 G/DL (ref 13.3–17.7)
LYMPHOCYTES # BLD AUTO: 1 K/UL (ref 0.8–5.3)
LYMPHOCYTES NFR BLD AUTO: 8.2 %L (ref 20–48)
MCH RBC QN AUTO: 30.2 PG (ref 26.5–35)
MCHC RBC AUTO-ENTMCNC: 31.3 G/DL (ref 32–36)
MCV RBC AUTO: 96.6 FL (ref 78–100)
MID #: 0.6 K/UL (ref 0–2.2)
MID %: 4.6 %M (ref 0–20)
PLATELET # BLD AUTO: 359 K/UL (ref 150–450)
POTASSIUM SERPL-SCNC: 3.8 MMOL/DL (ref 3.2–4.6)
RBC # BLD AUTO: 4.4 M/UL (ref 4.4–5.9)
SODIUM SERPL-SCNC: 135.8 MMOL/L (ref 132–142)
WBC # BLD AUTO: 12.8 K/UL (ref 4–11)

## 2019-09-09 NOTE — LETTER
September 9, 2019      Mikhail Bernard  [address]    [facility] [address]        Dear Mr. Bernard,  Your labs are acceptable. Your kidney function and blood salts are normal. Your CBC shows a mild increase WBC, which is a likely from steroids, otherwise if normal.   Good luck with surgery!     Please see below for your test results.    Resulted Orders   CBC with Diff Plt ([facility])   Result Value Ref Range    WBC 12.8 (H) 4.0 - 11.0 K/uL    Lymphocytes # 1.0 0.8 - 5.3 K/uL    % Lymphocytes 8.2 (L) 20.0 - 48.0 %L    Mid # 0.6 0.0 - 2.2 K/uL    Mid % 4.6 0.0 - 20.0 %M    GRANULOCYTES # 11.2 (H) 1.6 - 8.3 K/uL    % Granulocytes 87.2 (H) 40.0 - 75.0 %G    RBC 4.4 4.4 - 5.9 M/uL    Hemoglobin 13.4 13.3 - 17.7 g/dL    Hematocrit 42.8 40.0 - 53.0 %    MCV 96.6 78.0 - 100.0 fL    MCH 30.2 26.5 - 35.0    MCHC 31.3 (L) 32.0 - 36.0 g/dL    Platelets 359.0 150.0 - 450.0 K/uL   Basic Metabolic Panel ([facility])   Result Value Ref Range    Urea Nitrogen 19.5 7.0 - 21.0 mg/dL    Calcium 9.5 8.5 - 10.1 mg/dL    Chloride 97.7 (L) 98.0 - 110.0 mmol/L    Carbon Dioxide 32.8 (H) 20.0 - 32.0 mmol/L    Creatinine 0.8 0.7 - 1.3 mg/dL    Glucose 120.4 (H) 70.0 - 99.0 mg'dL    Potassium 3.8 3.2 - 4.6 mmol/dL    Sodium 135.8 132.0 - 142.0 mmol/L    GFR Estimate >90 >60.0 mL/min/1.7 m2    GFR Estimate If Black >90 >60.0 mL/min/1.7 m2       If you have any questions, please call the clinic to make an appointment.    Sincerely,    Erwin Metz MD

## 2019-09-09 NOTE — PROGRESS NOTES
[address] [address]  Phone: [phone]  Fax: [phone]    9/9/2019    Adult PRE-OP Evaluation:    Mikhail Bernard, 1950 presents for pre-operative evaluation and assessment as requested by Dr. Jamie Estrada, prior to undergoing surgery/procedure for treatment of  Left hip replacement for AVN.   .      Date of Surgery/ Procedure: 9/18/2019  Hospital/Surgical Facility: [facility]       Primary Physician: Erwin Metz  Type of Anesthesia Anticipated: Choice  History of anesthesia complications: NONE  History of  abnormal bleeding: NONE   History of blood transfusions: NO  Patient has a Health Care Directive or Living Will:  NO    Preoperative Questions   1. NO - Do you have a history of heart attack, stroke, stent, bypass or surgery on an artery in the head, neck, heart or legs?  2. NO - Do you ever have any pain or discomfort in your chest?  3. NO - Have you ever had a severe pain across the front of your chest lasting for half an hour or more?  4. NO - Do you have a history of Congestive Heart Failure?  5. YES - Are you troubled by shortness of breath when: walking on the level/ up a slight hill/ at night?   When hip is well, could walk one block at best  6. YES - Does your chest ever sound wheezy or whistling?   Known COPD  7. NO - Do you currently have a cold, bronchitis or other respiratory infection?  8. NO - Have you had a cold, bronchitis or other respiratory infection within the last 2 weeks?  9. NO - Do you usually have a cough?  10. NO - Do you sometimes get pains in the calves of your legs when you walk?  11. NO - Do you or anyone in your family have previous history of blood clots?  12. NO - Do you or does anyone in your family have a serious bleeding problem such as prolonged bleeding following surgeries or cuts?  13. NO - Have you ever had problems with anemia or been told to take iron pills?  14. NO - Have you had any abnormal blood loss such as  black, tarry or bloody stools, or abnormal vaginal bleeding?  15. NO - Have you ever had a blood transfusion?  16. YES - Have you or any of your relatives ever had problems with anesthesia?   Sister has an uspecified reaction leading to an ICU admission.  Patient will attempt to get details  17. NO - Do you have sleep apnea, excessive snoring or daytime drowsiness?  18. NO - Do you have any prosthetic heart valves?  19. NO - Do you have prosthetic joints?  20. NO - Is there any chance that you may be pregnant?    Patient Active Problem List   Diagnosis     COPD (chronic obstructive pulmonary disease) (H)     Alcohol abuse, in remission     Allergic rhinitis     Depressive disorder, not elsewhere classified     Tobacco use disorder     Postherpetic polyneuropathy     Chronic pain syndrome   Essential Hypertension    Surgical hx:  Negative--no prior surgery    Fam Hx   Family History   Problem Relation Age of Onset     Cardiovascular Father 40     Lung Cancer Father         smoked     Alcoholism Father      Osteoarthritis Sister      Cardiovascular Brother         in 60s           Current Outpatient Medications on File Prior to Visit:  albuterol (PROAIR HFA/PROVENTIL HFA/VENTOLIN HFA) 108 (90 Base) MCG/ACT inhaler Inhale 2 puffs into the lungs every 4 hours as needed for shortness of breath / dyspnea or wheezing   calcium carbonate 500 MG CHEW Take 1 tablet by mouth 2 times daily.   chlorthalidone (HYGROTON) 25 MG tablet Take 0.5 tablets (12.5 mg) by mouth daily   fluticasone-salmeterol (ADVAIR-HFA) 230-21 MCG/ACT inhaler Inhale 2 puffs into the lungs 2 times daily   ipratropium - albuterol 0.5 mg/2.5 mg/3 mL (DUONEB) 0.5-2.5 (3) MG/3ML neb solution Take 1 vial (3 mLs) by nebulization every 4 hours as needed for shortness of breath / dyspnea 3 ml via neb every 4-6 hours PRN wheezing   Multiple Vitamins-Minerals (CENTRUM SILVER) per tablet Take 1 tablet by mouth daily   omeprazole (PRILOSEC) 20 MG DR capsule Take 1  capsule (20 mg) by mouth daily   predniSONE (DELTASONE) 20 MG tablet Take 2 tablets (40 mg) by mouth daily For 5 days as needed for severe copd flare   traMADol (ULTRAM) 50 MG tablet 1 tab po at bedtime, and 1/2 to 1 tab every 6 hours as needed during the day   cholecalciferol (VITAMIN D3) 1000 UNIT tablet Take 1 tablet by mouth daily.   DiphenhydrAMINE HCl (BENADRYL PO)    order for DME Equipment being ordered: crutches and walking boot (Patient not taking: Reported on 7/11/2019)   order for DME Equipment being ordered: Nebulizer with appropriate tubing/mouthpiece or mask (Patient not taking: Reported on 7/11/2019)   predniSONE (DELTASONE) 10 MG tablet Take 1 tablet (10 mg) by mouth daily   predniSONE (DELTASONE) 20 MG tablet Take 2 tablets (40 mg) by mouth daily For 5 days as needed for severe copd flare     No current facility-administered medications on file prior to visit.     OTC products: MOM for constipation    Allergies   Allergen Reactions     Nkda [No Known Drug Allergies]      Latex Allergy: NO    Social History     Socioeconomic History     Marital status: Single     Spouse name: None     Number of children: None     Years of education: None     Highest education level: None   Occupational History     None   Social Needs     Financial resource strain: None     Food insecurity:     Worry: None     Inability: None     Transportation needs:     Medical: None     Non-medical: None   Tobacco Use     Smoking status: Former Smoker     Types: Cigarettes     Smokeless tobacco: Never Used   Substance and Sexual Activity     Alcohol use: No     Drug use: No     Sexual activity: None   Lifestyle     Physical activity:     Days per week: None     Minutes per session: None     Stress: None   Relationships     Social connections:     Talks on phone: None     Gets together: None     Attends Yazidi service: None     Active member of club or organization: None     Attends meetings of clubs or organizations: None      Relationship status: None     Intimate partner violence:     Fear of current or ex partner: None     Emotionally abused: None     Physically abused: None     Forced sexual activity: None   Other Topics Concern     None   Social History Narrative    Buddhist    Family:  Brother in [place] and sister in [facility]    Single    First grandchild in Dec 2014 followed 2 days later by 91 yo mother dying       REVIEW OF SYSTEMS:   Constitutional, HEENT, cardiovascular, GI, , musculoskeletal, neuro, skin, endocrine and psych systems are negative, except as otherwise noted.    COPD: fev1 26%--fev1 0.91 on 9/3/2013    EXAM:     Patient Vitals for the past 24 hrs:   BP Temp Temp src Pulse Resp SpO2 Weight   09/09/19 0809 128/81 98.7  F (37.1  C) Oral 98 20 96 % 85.5 kg (188 lb 6.4 oz)     Body mass index is 27.82 kg/m .  GENERAL: healthy, alert and no distress  EYES: Eyes grossly normal to inspection, extraocular movements - intact, and PERRL  HENT: ear canals- normal; TMs- normal; Nose- normal; Mouth- no ulcers, no lesions.  No dentures  NECK: no tenderness, no adenopathy, no asymmetry, no masses, no stiffness; thyroid- normal to palpation  RESP: lungs clear to auscultation - no rales, no rhonchi, no wheezes  CV: regular rates and rhythm, normal S1 S2, no S3 or S4 and no murmur, no click or rub -  ABDOMEN: soft, no tenderness, no  hepatosplenomegaly, no masses, normal bowel sounds  MS: extremities- no gross deformities noted, no edema  SKIN: bruising on forearms.  Skin thin   NEURO: strength and tone- normal, sensory exam- grossly normal, mentation- intact, speech- normal, reflexes- symmetric  BACK: no CVA tenderness, no paralumbar tenderness  PSYCH: Alert and oriented times 3; speech- coherent , normal rate and volume; able to articulate logical thoughts  LYMPHATICS: ant. cervical- normal, post. cervical- normal    DIAGNOSTICS:      EKG--see copy.      RISK ASSESSMENT:     Cardiovascular Risk:  -Patient is able to perform  ADL's without assistance without chest pain.  -The patient does not have chest pain with exertion.  -Patient does not have a history of congestive heart failure.    -The patient does not have a history of stroke and does not have a history of valvular disease.    Pulmonary Risk:  -In terms of risk factors for pulmonary complication, the patient has COPD with  FEV1 <1 on chronic prednisone @ 10 mg daily.  See rec's for stress dose steroids, below.    Recommend regional anesthesia, if possible.      Perioperative Complications:  -The patient does not have a history of bleeding or clotting problems in the past.    -The patient has not had surgery previously.    -The patient does not have a family history of any anesthesia or surgical complications.  Pt is unsure of details but will talk with sister about them    IMPRESSION:   Reason for surgery/procedure: pain from AVN of left hip    The proposed surgical procedure is considered INTERMEDIATE risk.    For above listed surgery and anesthesia:   Patient is at moderate risk for surgery/procedure and perioperative/procedure complications.    RECOMMENDATIONS:     Labs:  EKG and CBC     Results for orders placed or performed in visit on 08/19/19   Rapid Urine Drug Screen ([facility])   Result Value Ref Range    Phencyclidine NEGATIVE NEGATIVE    Propoxyphene NEGATIVE NEGATIVE    Tricyclic Antidepressants NEGATIVE NEGATIVE    Amphetamines Qual NEGATIVE NEGATIVE    Barbiturates Qual Urine NEGATIVE NEGATIVE    Buprenorphine Qual Urine NEGATIVE NEGATIVE    Benzodiazepine Qual Urine NEGATIVE NEGATIVE    Cocaine Qual Urine NEGATIVE NEGATIVE    Cannabinoids Qual Urine NEGATIVE NEGATIVE    Methamphetamine Qual NEGATIVE NEGATIVE    Methadone Qual NEGATIVE NEGATIVE    Morphine Qual NEGATIVE NEGATIVE    Oxycodone Qual NEGATIVE NEGATIVE    Temperature of Urine was Between  Degrees F YES YES   Basic Metabolic Panel ([place])   Result Value Ref Range    Urea Nitrogen 21.7 (H) 7.0 - 21.0  mg/dL    Calcium 9.7 8.5 - 10.1 mg/dL    Chloride 105.6 98.0 - 110.0 mmol/L    Carbon Dioxide 27.1 20.0 - 32.0 mmol/L    Creatinine 0.9 0.7 - 1.3 mg/dL    Glucose 117.9 (H) 70.0 - 99.0 mg'dL    Potassium 4.5 3.2 - 4.6 mmol/dL    Sodium 141.7 132.0 - 142.0 mmol/L    GFR Estimate >90 >60.0 mL/min/1.7 m2    GFR Estimate If Black >90 >60.0 mL/min/1.7 m2       Fasting:  NPO for 12 hours prior to surgery    Preop Plan:  --Patient has   Pulmonary Risk  Incentive spirometry post op  Respiratory Therapy (Respiratory Care IP Consult)  post op  Use stress dose steroids based on chronic prednisone use:  rec hydrocortisone 50 mg IV on call to OR      Medications:  Patient should hold their regular medications the morning of surgery unless otherwise instructed.    Hold aspirin 7 days prior to surgery.  Hold ibuprofen for 5 days prior.      Erwin Metz MD    Please contact our office if there are any further questions or information required about this patient.

## 2019-09-22 ENCOUNTER — OFFICE VISIT (OUTPATIENT)
Dept: URGENT CARE | Facility: URGENT CARE | Age: 69
End: 2019-09-22
Payer: COMMERCIAL

## 2019-09-22 VITALS
OXYGEN SATURATION: 95 % | RESPIRATION RATE: 20 BRPM | HEART RATE: 109 BPM | DIASTOLIC BLOOD PRESSURE: 74 MMHG | TEMPERATURE: 97.7 F | SYSTOLIC BLOOD PRESSURE: 106 MMHG

## 2019-09-22 DIAGNOSIS — R60.0 LEG EDEMA, LEFT: Primary | ICD-10-CM

## 2019-09-22 RX ORDER — ASPIRIN 325 MG
325 TABLET, DELAYED RELEASE (ENTERIC COATED) ORAL
COMMUNITY
Start: 2019-09-18 | End: 2020-01-23

## 2019-09-22 RX ORDER — PANTOPRAZOLE SODIUM 40 MG/1
40 TABLET, DELAYED RELEASE ORAL
COMMUNITY
Start: 2018-01-17 | End: 2020-01-23

## 2019-09-22 RX ORDER — ACETAMINOPHEN 500 MG
1000 TABLET ORAL
COMMUNITY
Start: 2019-09-18

## 2019-09-22 RX ORDER — SENNOSIDES A AND B 8.6 MG/1
2 TABLET, FILM COATED ORAL
COMMUNITY
Start: 2019-09-18

## 2019-09-22 RX ORDER — HYDROCODONE BITARTRATE AND ACETAMINOPHEN 5; 325 MG/1; MG/1
1-2 TABLET ORAL
COMMUNITY
Start: 2019-09-18 | End: 2020-01-23

## 2019-09-22 NOTE — PROGRESS NOTES
Pt came here for leg swelling on the left leg, he just had BRIAN on the left hip on 9/18/19, pt has no fever or chill. No pain on the calf. There is swelling noted on the right calf and also felt tense to touch. At this time I think he will need to have doppler ultrasound to rule out DVT. Pt has his surgery done in [facility] and he will go to ER there to get further evaluation. Patient understand and agreeable with this plan, all question answered.    no charge for this visit

## 2019-09-30 ENCOUNTER — TELEPHONE (OUTPATIENT)
Dept: FAMILY MEDICINE | Facility: CLINIC | Age: 69
End: 2019-09-30

## 2019-10-01 DIAGNOSIS — J44.9 CHRONIC OBSTRUCTIVE PULMONARY DISEASE, UNSPECIFIED COPD TYPE (H): ICD-10-CM

## 2019-10-01 RX ORDER — PREDNISONE 10 MG/1
10 TABLET ORAL DAILY
Qty: 90 TABLET | Refills: 0 | Status: SHIPPED | OUTPATIENT
Start: 2019-10-01 | End: 2019-12-05

## 2019-11-05 DIAGNOSIS — J44.9 CHRONIC OBSTRUCTIVE PULMONARY DISEASE, UNSPECIFIED COPD TYPE (H): ICD-10-CM

## 2019-11-05 RX ORDER — FLUTICASONE PROPIONATE AND SALMETEROL XINAFOATE 230; 21 UG/1; UG/1
2 AEROSOL, METERED RESPIRATORY (INHALATION) 2 TIMES DAILY
Qty: 36 G | Refills: 11 | Status: SHIPPED | OUTPATIENT
Start: 2019-11-05 | End: 2020-11-23

## 2019-11-09 ENCOUNTER — HEALTH MAINTENANCE LETTER (OUTPATIENT)
Age: 69
End: 2019-11-09

## 2019-11-18 ENCOUNTER — OFFICE VISIT (OUTPATIENT)
Dept: FAMILY MEDICINE | Facility: CLINIC | Age: 69
End: 2019-11-18
Payer: COMMERCIAL

## 2019-11-18 VITALS
OXYGEN SATURATION: 95 % | WEIGHT: 197 LBS | HEART RATE: 96 BPM | SYSTOLIC BLOOD PRESSURE: 134 MMHG | DIASTOLIC BLOOD PRESSURE: 73 MMHG | TEMPERATURE: 98 F | RESPIRATION RATE: 16 BRPM | BODY MASS INDEX: 29.09 KG/M2

## 2019-11-18 DIAGNOSIS — I47.10 SVT (SUPRAVENTRICULAR TACHYCARDIA) (H): Primary | ICD-10-CM

## 2019-11-18 DIAGNOSIS — I10 ESSENTIAL HYPERTENSION: ICD-10-CM

## 2019-11-18 RX ORDER — METOPROLOL TARTRATE 25 MG/1
25 TABLET, FILM COATED ORAL 2 TIMES DAILY
COMMUNITY
End: 2019-11-18 | Stop reason: ALTCHOICE

## 2019-11-18 RX ORDER — METOPROLOL SUCCINATE 100 MG/1
100 TABLET, EXTENDED RELEASE ORAL DAILY
Qty: 30 TABLET | Refills: 11 | Status: SHIPPED | OUTPATIENT
Start: 2019-11-18 | End: 2020-11-19

## 2019-11-18 NOTE — PATIENT INSTRUCTIONS
Stop chlorthalidone and metoprol 25 mg tabs    Start toprol  mg daily for both BP and heart rate.        Patient Education     Treatment for Supraventricular Tachycardia  Supraventricular tachycardia (SVT) is a class of abnormally fast heart rhythms that originate from the top chambers of the heart called the atria. The normal heart rhythm is generated by your sinus node and the electricity is conducted through the heart over specialized nerves.  When the electricity meets the muscle cells this results in regular and coordinated heart beats. During SVT, the heart rhythm may be generated by an abnormal area of excited heart muscle in the atria or by an abnormal electric circuit that has formed in the heart leading to rapid electrical activity. This can results in symptoms of palpitations, shortness of breath, chest pain, dizziness, or fainting.  Types of treatment  You may not need treatment for SVT if you have only rare episodes. If you do need treatment, there are several kinds. They include:    Valsalva maneuver. This is a way to increase pressure in the abdomen and chest. It can correct your heart rhythm right away. To do it, you bear down with your stomach muscles, as though you are trying to have a bowel movement.    Carotid massage. Your healthcare provider may rub the carotid artery in your neck. This produces a slowing heart rate reflex in the heart and can sometimes stop the arrhythmia.    Medicine. There are various kinds you can take. Calcium channel or beta blockers can help correct heart rhythm. If you have SVT only 1 or 2 times a year, you may take beta-blockers or calcium channel medicines by mouth (orally) as needed. If your SVT is more frequent, you may need to take medicine every day. Some people may need to take several medicines to prevent episodes of SVT. For emergent cases, calcium channel or beta blockers can be given through IV (intravenously) for more rapid correction of the heart  rhythm. Adenosine is another medicince that can be given through IV as well that can work in a matter of seconds. It is not available in an oral form.    Electrocardioversion. This is a shock to the heart to restart a normal rhythm right away. This may be done if you have a severe episode of SVT.    Catheter ablation. This can help cure SVT. Your healthcare provider puts a thin, flexible tube (catheter) into a blood vessel in the groin. He or she then gently pushes it up into your heart. The area of your heart that causes your SVT is then either cauterized with heat or scarred with freezing energy. This prevents that area from starting a signal that causes SVT. An ablation may need to be repeated if a new excited nerve area in the atria develops and the SVT returns.  Lifestyle changes to help prevent SVT episodes  Your healthcare provider might suggest other ways to help prevent SVT, such as the following:    Have less alcohol and caffeine    Don't smoke    Lower your stress    Eat foods that are healthy for your heart    Don't take recreational drugs, especially stimulants that can over-excite the heart muscle. Some herbs and supplements can have this same effect. Always check with your healthcare team before you take any non-prescribed medicines.    Stay well hydrated and get enough sleep  Call 911  Call 911 right away if you have:    Sudden shortness of breath  When to call your healthcare provider  Call your healthcare provider if you have any of the following:    Severe palpitations    Severe dizziness or fainting    Severe chest pain    Symptoms that are happening more often  Date Last Reviewed: 4/1/2018 2000-2018 [employer]. [address] [place] [address], [place] [address]. All rights reserved. This information is not intended as a substitute for professional medical care. Always follow your healthcare professional's instructions.           Recombinant Zoster (Shingles) Vaccine, RZV:   "\"Shingrix\"  What You Need to Know    Why get vaccinated?  Shingles (also called herpes zoster, or just zoster) is a painful skin rash, often with blisters. Shingles is caused by the varicella zoster virus, the same virus that causes chickenpox. After you have chickenpox, the virus stays in your body and can cause shingles later in life.    You can t catch shingles from another person. However, a person who has never had chickenpox (or chickenpox vaccine) could get chickenpox from someone with shingles.      Who gets shingles?  Shingles is far more common in people 50 years of age and older than in younger people, and the risk increases with age. Shingles is most common after the age of 60. It is also more common in people whose immune system is weakened because of a disease such as cancer, or by drugs such as steroids or chemotherapy.    At least 1 million people a year in the [place] get shingles.    About 1 out of 3 people will get shingles in their lifetime.    How do I know if I have shingles?  A shingles rash usually appears on one side of the face or body and heals within 2 to 4 weeks. Its main symptom is pain, which can be severe.                 Other symptoms can include fever, headache, chills, and upset stomach. Very rarely, a shingles infection can lead to pneumonia, hearing problems, blindness, brain inflammation (encephalitis), or death.    Can shingles cause long term problems?  Severe pain can continue even long after the rash has cleared up. This long-lasting pain is called post-herpetic neuralgia (PHN).  This occurs between 1 in 3 and 1 in 10 people that get shingles, and is more common with age.      How long has Shingrix been available?  Recombinant shingles vaccine was approved by FDA in 2017 for the prevention of shingles.   How effective is Shingrix?  Two studies have evaluated this question.    In one study of adults over 50, the vaccine reduced the risk of shingles by 97%.    In a " second study of more than 17370 adults 70 and older, the vaccine reduced the risk of shingles by 90%, and the risk of PHN by 89%.    Two doses, 2 to 6 months apart, are recommended for adults 50 and older.    This vaccine is also recommended for people who have already gotten the live shingles vaccine (Zostavax). There is no live virus in this vaccine.      Some people should not get this vaccine  Tell your vaccine provider if you:  Have any severe, life-threatening allergies. A person who has ever had a life-threatening allergic reaction after a dose of recombinant shingles vaccine, or has a severe allergy to any component of this vaccine, may be advised not to be vaccinated. Ask your health care provider if you want information about vaccine components.  Are pregnant or breastfeeding. There is not much information about use of recombinant shingles vaccine in pregnant or nursing women. Your healthcare provider might recommend delaying vaccination.  Are not feeling well. If you have a mild illness, such as a cold, you can probably get the vaccine today. If you are moderately or severely ill, you should probably wait until you recover. Your doctor can advise you.    What are the risks of the vaccine?  With any medicine, including vaccines, there is a chance of reactions.  After recombinant shingles vaccination, a person might experience:  Pain, redness, soreness, or swelling at the site of the injection  Headache, muscle aches, fever, shivering, fatigue  In clinical trials, most people got a sore arm with mild or moderate pain after vaccination, and some also had redness and swelling where they got the shot. Some people felt tired, had muscle pain, a headache, shivering, fever, stomach pain, or nausea.  About 1 out of 6 people who got recombinant zoster vaccine experienced side effects that prevented them from doing regular activities.  Symptoms went away on their own in about 2 to 3 days. Side effects were more  common in younger people.    You should still get the second dose of recombinant zoster vaccine even if you had one of these reactions after the first dose.    Other things that could happen after this vaccine:  People sometimes faint after medical procedures, including vaccination. Sitting or lying down for about 15 minutes can help prevent fainting and injuries caused by a fall. Tell your provider if you feel dizzy or have vision changes or ringing in the ears.  Some people get shoulder pain that can be more severe and longer-lasting than routine soreness that can follow injections. This happens very rarely.  Any medication can cause a severe allergic reaction. Such reactions to a vaccine are estimated at about 1 in a million doses, and would happen within a few minutes to a few hours after the vaccination.  As with any medicine, there is a very remote chance of a vaccine causing a serious injury or death.    The safety of vaccines is always being monitored.  For more information, visit: www.cdc.gov/vaccinesafety/.    What if there is a serious problem?  What should I look for?    Look for anything that concerns you, such as signs of a severe allergic reaction, very high fever, or unusual behavior.    Signs of a severe allergic reaction can include hives, swelling of the face and throat, difficulty breathing, a fast heartbeat, dizziness, and weakness. These would usually start a few minutes to a few hours after the vaccination.    What should I do?    If you think it is a severe allergic reaction or other emergency that can t wait, call 9-1-1 or get to the nearest hospital. Otherwise, call your health care provider.    Afterward, the reaction should be reported to the Vaccine Adverse Event Reporting System (VAERS). Your doctor should file this report, or you can do it yourself through the VAERS website, or by calling 1-320.797.3200.    How can I learn more?  Ask your health care provider. He or she can give you  the vaccine package insert or suggest other sources of information.  Contact your local or state health department.  Contact the Centers for Disease Control and Prevention (CDC):  Call 1-879.697.4443 (0-773-TGP-INFO) or  Visit CDC s vaccines website    How often is it covered?  The shingles shot isn t covered by Medicare Part A (Hospital Insurance) or Medicare Part B (Medical Insurance). Generally, Medicare prescription drug plans (Part D) cover all commercially-available vaccines (like the shingles shot) needed to prevent illness. Contact your Medicare drug plan for more information about coverage.    Current cost of the 2 shot vaccine is approximately $280.

## 2019-11-18 NOTE — PROGRESS NOTES
Discharge Summaries  - documented in this encounter  Ghada Rm MD - 10/29/2019 5:17 PM CDT  Formatting of this note might be different from the original.  DISCHARGE SUMMARY     Patient ID:  Mikhail Bernard  [id number]  68 y.o.  1950     Admit date: 10/26/2019     Discharge date and time: 10/29/2019      Final Discharge Diagnoses:   Closed dislocation of left hip  SVT (supraventricular tachycardia)  traumatic rhabdomyolysis  COPD (chronic obstructive pulmonary disease)  Depression  Alcohol dependence in remission  Postoperative anemia due to acute blood loss  Spinal compression fracture    HPI: 68 year old male with a history of COPD and left BRIAN on 9/18/2019 by Dr. Estrada who fell at home and sustained a closed dislocation of his left hip. This was reduced in the ER. He did have an episode of SVT in the ER as well. He fell after standing up quickly. He had been drinking which he had abstained for several years.    Please see the admission history and physical for full details.      Hospital Course: Presented to ED on 10/26/19 with closed left hip arthroplasty dislocation after he fell at home and was on the floor for several hours. The hip was reduced in the ED and imaging of the head and spine was largely unremarkable, aside from a suspected old compression fractures of the thoracic and lumbar spine. At the time of discharge, the patient was ambulating well, without significant pain.     CK was elevated at 804, and this valarie to 1014 on 10/27/19, suspected due to nontraumatic rhabdomyolysis from the hours laying on the floor at home. CK trended downward with IV fluids and was 443 at the time of discharge.      Patient did have one run of SVT with HR in the 170's the morning of 10/28/19. This resolved without intervention but he was given 5 mg of Metoprolol IV and started on Metoprolol PO for rate control. He will continue the Metoprolol after discharge.      The patient noticed left lower lateral chest wall  "point tenderness one day into his hospital stay. Suspected to be a rib fracture vs rib contusion from his fall. He had substantial relief with a lidocaine patch.     - Procedures: Closed hip reduction under procedural sedation in ED  - Consults: occupational therapy and physical therapy    Discharge Exam:   /61 (BP Location: Right Arm, BP Cuff Size: Regular)  Pulse 75  Temp 36.6  C (97.8  F) (Oral)  Resp 18  Ht 1.753 m (5' 9\")  Wt 83.9 kg (185 lb)  SpO2 92%  BMI 27.32 kg/m       General: Alert, NAD  Heart: RRR, no murmurs, S3, S4, or other extra heart sounds  Lungs: Inspiratory wheezing in all lung fields while seated upright, although these resolve when he reclines at 45 degrees. Bibasilar crackles.   Chest Wall: Less than 5 cm area of focal tenderness over the left lower lateral rib margin, slightly less tender than yesterday.   Abdomen: Distended. Mild diffuse tenderness to palpation with more focal tenderness in the left lower quadrant.   Extremities: Multiple areas of ecchymosis to the bilateral upper extremities ranging in size from ~2 to 8 cm in longest dimension. Area of ecchymosis over the left greater trochanter has diminished significantly. No lower extremity edema.      Disposition: Home      Condition at Discharge: Stable     Pending Tests: None     Discharge Medications:   Done while pt is already discharged     Current Discharge Medication List         START taking these medications     Details   lidocaine (ASPERCREAM) 4 % patch Apply 1 Patch to skin daily. Leave on for up to 12 hours in a 24 hour period, then remove.  Qty: 15 Patch, Refills: 1       metoprolol tartrate (LOPRESSOR) 25 MG tablet Take 1 Tablet by mouth two times a day.  Qty: 60 Tablet, Refills: 3             CONTINUE these medications which have CHANGED     Details   chlorthalidone (HYGROTON) 25 MG tablet Take 0.5 Tablets by mouth daily. Takes 12.5mg             CONTINUE these medications which have NOT CHANGED     Details "   acetaminophen (TYLENOL) 500 MG tablet Take 2 Tablets by mouth three times a day. 24 hour limit of acetaminophen (TYLENOL) is 4000mg. Each tablet contains 500mg of acetaminophen. Please be aware of acetaminophen limit when taking other medications that contain acetaminophen. After 10 day course is complete may continue to take every 6 hours as needed. Indications: Pain  Qty: 100 Tablet, Refills: 0       ALBUTEROL SULFATE (2.5 MG/3ML) 0.083% IN NEBU 3 mL Inhalation every 4 hours while awake  Qty: 540 mL, Refills: 0       fluticasone-salmeterol (ADVAIR HFA) 230-21 mcg/actuation inhaler Inhale 2 Puffs two times a day. Rinse mouth/gargle after use       hydrOXYzine pamoate (VISTARIL) 25 MG capsule Take 1-2 Capsules by mouth at bedtime as needed.  Qty: 25 Capsule, Refills: 0       ipratropium-albuterol (DUONEB) 0.5-2.5 (3) mg/3ml nebulizer solution Inhale 3 mL two times a day. And every 4 hours as needed       omeprazole (PRILOSEC) 20 MG capsule Take 20 mg by mouth daily. Take 1 hour before a meal.       predniSONE (DELTASONE) 10 MG tablet Take 10 mg by mouth daily.       PROAIR  (90 BASE) MCG/ACT IN AERS Inhale 2-4 puffs by mouth every 4-6 hours as needed for breathing difficulties. Do not use more than 12 puffs in 24 hours.              Code Status:  Code status: FULL    Follow up:   Erwin Metz MD  [address] [place] [address]  [phone]     APPOINTMENT SCHEDULED for Monday, November 4th @ 2:10pm with Dr. Metz  If needed, reschedule in 7-10 days after discharge    Recommendations for primary care physician:   SVT  - Could titrate Metoprolol if patient becomes symptomatic. Consider Zio Patch to evaluate for runs of SVT.      See the electronic medical record for full laboratory and diagnostic test results.     For full discharge orders and instructions, please see the after visit summary for this hospitalization.    Hardy Denny  MS3 on Internal Medicine Rotation  10/29/2019 3:05 PM       Addended by Dr. Ghada Rm  This note reflects my exam and plans, followup  This was discussed in detail with the patient andMS3 Heltesms.  Ghada Rm MD

## 2019-11-18 NOTE — PROGRESS NOTES
Patient Active Problem List    Diagnosis Date Noted     Chronic pain syndrome 07/11/2019     Priority: Medium     Chronic Pain Diagnosis:  osteonecrosis  DIRE Total Score(s):No flowsheet data found.  16  Score 14-21: May be a candidate for long-term opioid analgesia   ORT: 6    4 - 7 =  Moderate Risk   of future problems with Opioids  FAQ5: No flowsheet data found.  Behavioral Health Consultation: not done yet  Care Plan Date: not done.  Goal is to have definitive tx with left BRIAN   Opioid medication:tramadol   Dose:50mg  Number of pills per month:60  Patient is being prescribed   Morphine Equivalents Unknown  Benzodiazepines Prescribed? No  Naloxone prescribed? Will address in future     Clinic visit frequency required: Q 1 month   Controlled Substance/Opioid Treatment agreement on file (dated <12 months ago): Date Signed: na  Items in red to be updated at each visit  Patient is followed by JOSE ALB[facility]O [facility]O for ongoing prescription of pain medication.  All refills should be approved by this provider, or covering partner.           Alcohol abuse, in remission 02/15/2013     Priority: Medium     Dry since 2008       Allergic rhinitis 02/15/2013     Priority: Medium     Problem list name updated by automated process. 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  Chief Complaint   Patient presents with     Surgical Followup     f/u hip replacement     Heart Problem     dx with psvt     Blood pressure 134/73, pulse 96, temperature 98  F (36.7  C), temperature source Oral, resp. rate 16,  weight 89.4 kg (197 lb), SpO2 95 %.  No results found for any visits on 11/18/19.  Hip pain is great--very happy.  Uses cane primarily for rest.    Had some substantial post-operative edema that has resolved.    About 5 weeks post-op had syncope and fell.  Called ambulance and they found that hip had been dislocated.    Had episodes of SVT, as well on 3 occassions.  Never any symptoms.    Episodes in generally were very brief, but on one instanceit lasted severl minutes and was assoc w/ symptoms. Sent home on toprol.  Happened at home last week, too.  Pulse ox showed approx 170 then resolved.      Continued dictation on Mikhail Bernard:     Each time that it has occurred it has resolved on its own.  ?? has been a few minutes. He is unsure if this happened in the distant past.  He has generally felt a little bit more lightheaded at times since he had his hip surgery.  No history of any prior heart problems.  He hasn't had an echocardiogram.  He has never been told that he has a heart murmur.  He feels fine today.      OBJECTIVE:   VITAL SIGNS:  Blood pressure and pulse are listed.   CHEST:  Clear.   HEART:  Regular rate, with no murmurs.   EXTREMITIES:  No edema.   Chart and discharge summary reviewed.      ASSESSMENT:   1.  Paroxysmal SVT.   2.  Essential hypertension.   PLAN:   1.  Stop chlorthalidone and low-dose metoprolol tartrate.   2.  Start Toprol- mg a day, increasing the dose so as to treat both his hypertension and prevent SVT.  I gave him some information about SVT and discussed the Valsalva maneuver in order to abort episodes should they occur.  I discussed possible adverse effects of metoprolol, including increasing his dyspnea or COPD.  He'll follow up in two months.   3.  Pneumonia vaccine was given today.     Shingles info given as well--has had this in the past and is unlikely to have it again, but I discussed that it is still an option for him,but at some cost--he will consider this in the  future.

## 2019-11-19 DIAGNOSIS — G89.4 CHRONIC PAIN SYNDROME: ICD-10-CM

## 2019-11-19 DIAGNOSIS — J44.1 COPD EXACERBATION (H): ICD-10-CM

## 2019-11-19 RX ORDER — PREDNISONE 20 MG/1
40 TABLET ORAL DAILY
Qty: 20 TABLET | Refills: 2 | Status: SHIPPED | OUTPATIENT
Start: 2019-11-19 | End: 2020-10-14

## 2019-12-05 DIAGNOSIS — J44.9 CHRONIC OBSTRUCTIVE PULMONARY DISEASE, UNSPECIFIED COPD TYPE (H): ICD-10-CM

## 2019-12-09 RX ORDER — PREDNISONE 10 MG/1
10 TABLET ORAL DAILY
Qty: 90 TABLET | Refills: 0 | Status: SHIPPED | OUTPATIENT
Start: 2019-12-09 | End: 2020-03-02

## 2020-01-23 ENCOUNTER — CLINICAL UPDATE (OUTPATIENT)
Dept: PHARMACY | Facility: PHYSICIAN GROUP | Age: 70
End: 2020-01-23
Payer: COMMERCIAL

## 2020-01-23 ENCOUNTER — OFFICE VISIT (OUTPATIENT)
Dept: FAMILY MEDICINE | Facility: CLINIC | Age: 70
End: 2020-01-23
Payer: COMMERCIAL

## 2020-01-23 VITALS
TEMPERATURE: 97.7 F | OXYGEN SATURATION: 96 % | HEIGHT: 67 IN | WEIGHT: 199 LBS | HEART RATE: 79 BPM | DIASTOLIC BLOOD PRESSURE: 74 MMHG | SYSTOLIC BLOOD PRESSURE: 148 MMHG | BODY MASS INDEX: 31.23 KG/M2 | RESPIRATION RATE: 24 BRPM

## 2020-01-23 DIAGNOSIS — J44.9 CHRONIC OBSTRUCTIVE PULMONARY DISEASE, UNSPECIFIED COPD TYPE (H): ICD-10-CM

## 2020-01-23 DIAGNOSIS — R10.9 ABDOMINAL DISCOMFORT: ICD-10-CM

## 2020-01-23 DIAGNOSIS — J44.9 CHRONIC OBSTRUCTIVE PULMONARY DISEASE, UNSPECIFIED COPD TYPE (H): Primary | ICD-10-CM

## 2020-01-23 DIAGNOSIS — Z00.00 PREVENTATIVE HEALTH CARE: Primary | ICD-10-CM

## 2020-01-23 LAB
% GRANULOCYTES: 85.6 %G (ref 40–75)
ALBUMIN SERPL-MCNC: 4.7 MG/DL (ref 3.3–4.9)
ALP SERPL-CCNC: 59.8 U/L (ref 40–150)
ALT SERPL-CCNC: 19.8 U/L (ref 0–45)
AST SERPL-CCNC: 19.3 U/L (ref 0–55)
BILIRUB SERPL-MCNC: <0.3 MG/DL (ref 0.2–1.3)
BILIRUBIN DIRECT: 0.1 MG/DL (ref 0–0.2)
GRANULOCYTES #: 9.1 K/UL (ref 1.6–8.3)
HCT VFR BLD AUTO: 42.3 % (ref 40–53)
HEMOGLOBIN: 13.1 G/DL (ref 13.3–17.7)
LYMPHOCYTES # BLD AUTO: 0.9 K/UL (ref 0.8–5.3)
LYMPHOCYTES NFR BLD AUTO: 8.9 %L (ref 20–48)
MCH RBC QN AUTO: 29.8 PG (ref 26.5–35)
MCHC RBC AUTO-ENTMCNC: 31 G/DL (ref 32–36)
MCV RBC AUTO: 96.3 FL (ref 78–100)
MID #: 0.6 K/UL (ref 0–2.2)
MID %: 5.5 %M (ref 0–20)
PLATELET # BLD AUTO: 369 K/UL (ref 150–450)
PROT SERPL-MCNC: 7.8 G/DL (ref 6.8–8.8)
RBC # BLD AUTO: 4.4 M/UL (ref 4.4–5.9)
WBC # BLD AUTO: 10.6 K/UL (ref 4–11)

## 2020-01-23 PROCEDURE — 99207 ZZC NO CHARGE LOS: CPT | Performed by: PHARMACIST

## 2020-01-23 RX ORDER — FAMOTIDINE 20 MG/1
20 TABLET, FILM COATED ORAL 2 TIMES DAILY
Qty: 60 TABLET | Refills: 0 | Status: SHIPPED | OUTPATIENT
Start: 2020-01-23

## 2020-01-23 RX ORDER — TIOTROPIUM BROMIDE 18 UG/1
18 CAPSULE ORAL; RESPIRATORY (INHALATION) DAILY
Qty: 30 CAPSULE | Refills: 2 | Status: SHIPPED | OUTPATIENT
Start: 2020-01-23 | End: 2020-04-01

## 2020-01-23 ASSESSMENT — PAIN SCALES - GENERAL: PAINLEVEL: NO PAIN (0)

## 2020-01-23 ASSESSMENT — PATIENT HEALTH QUESTIONNAIRE - PHQ9: SUM OF ALL RESPONSES TO PHQ QUESTIONS 1-9: 0

## 2020-01-23 ASSESSMENT — MIFFLIN-ST. JEOR: SCORE: 1631.41

## 2020-01-23 NOTE — PROGRESS NOTES
Inhaler Education Note                                                       Mikhail was referred to me by Dr. Metz for inhaler education    Assessment    Inhaler Technique  Type of Inhaler:  Spiriva Handihaler   Baseline inhaler technique: Good  Educated pt on correct inhaler technique.  After 1 attempt, patient was satisfactorily able to use Spiriva inhaler.  Required holding chamber: No  Required holding chamber with mask: No      Provided inhaler education.  Reviewed indication, effectiveness, and side effects of medications.  Reviewed directions for use.  Reviewed priming of inhalers and how to tell when inhaler is empty.      Plan  1. Continue current medications    Dr. Metz was provided the recommendations above in clinic today and Dr. Metz was available for supervision during this visit and is the authorizing prescriber for this visit through the pharmacist collaborative practice agreement.    Kelli Quinonez, PharmD Student    I was present with the pharmacy student who participated in the service and in the documentation of this note. I have verified the history, personally performed the medical decision making, and have verified the content of the note, which accurately reflects my assessment of the patient and the plan of care.   Odalis Javier, [facility], PharmD

## 2020-01-23 NOTE — NURSING NOTE
Chief Complaint   Patient presents with     RECHECK     F/U on Heart Problems and PSVT. Patient wants also to F/U on his COPD     Devaughn Ann, CMA

## 2020-01-23 NOTE — PROGRESS NOTES
ASSESSMENT AND PLAN     (Z00.00) Preventative health care  (primary encounter diagnosis)  Comment: agrees to FIt through nurse  Plan: Fecal Occult Blood - FIT, iFOB ([facility])        given    (J44.9) Chronic obstructive pulmonary disease, unspecified COPD type (H)  Comment: does not meed medicare criteria for O2, but requests it.  I think it may help his sx but I don't think it will be covered.    Plan: tiotropium (SPIRIVA) 18 MCG inhaled capsule        Try adding this.  reeval in 1 mo    (R10.9) Abdominal discomfort  Comment: epigastric bloating w/ early satiety.  Newness mildly worrisome, but no other red flags.  Labs as below.  Try scheduled h2 blocker and recheck in 3-4 weeks.  Consider EGD if not better.    Plan: CBC with Diff Plt ([facility]), Hepatic Panel         ([place]), famotidine (PEPCID) 20 MG tablet            .           SUBJECTIVE      Mikhail Bernard is a 69 year old  male with a PMH significant for:     Patient Active Problem List    Diagnosis Date Noted     Chronic pain syndrome 07/11/2019     Priority: Medium     Chronic Pain Diagnosis:  osteonecrosis  DIRE Total Score(s):No flowsheet data found.  16  Score 14-21: May be a candidate for long-term opioid analgesia   ORT: 6    4 - 7 =  Moderate Risk   of future problems with Opioids  FAQ5: No flowsheet data found.  Behavioral Health Consultation: not done yet  Care Plan Date: not done.  Goal is to have definitive tx with left BRIAN   Opioid medication:tramadol   Dose:50mg  Number of pills per month:60  Patient is being prescribed   Morphine Equivalents Unknown  Benzodiazepines Prescribed? No  Naloxone prescribed? Will address in future     Clinic visit frequency required: Q 1 month   Controlled Substance/Opioid Treatment agreement on file (dated <12 months ago): Date Signed: na  Items in red to be updated at each visit  Patient is followed by JOSE ALBERTO BOBO for ongoing prescription of pain medication.  All refills should be approved by this  provider, or covering partner.           Alcohol abuse, in remission 02/15/2013     Priority: Medium     Dry since 2008       Allergic rhinitis 02/15/2013     Priority: Medium     Problem list name updated by automated process. 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     Priority: Medium     COPD (chronic obstructive pulmonary disease) (H) 12/04/2012     Priority: Medium     fev1 26%--fev1 0.91 on 9/3/2013  fev1 31%--1.11;fev1/fvc 44% pred 10/09  CXR neg 6/2013         Current Outpatient Medications   Medication     acetaminophen (TYLENOL) 500 MG tablet     albuterol (PROAIR HFA/PROVENTIL HFA/VENTOLIN HFA) 108 (90 Base) MCG/ACT inhaler     DiphenhydrAMINE HCl (BENADRYL PO)     fluticasone-salmeterol (ADVAIR-HFA) 230-21 MCG/ACT inhaler     ipratropium - albuterol 0.5 mg/2.5 mg/3 mL (DUONEB) 0.5-2.5 (3) MG/3ML neb solution     metoprolol succinate ER (TOPROL-XL) 100 MG 24 hr tablet     omeprazole (PRILOSEC) 20 MG DR capsule     order for DME     predniSONE (DELTASONE) 10 MG tablet     predniSONE (DELTASONE) 20 MG tablet     senna (SENOKOT) 8.6 MG tablet     No current facility-administered medications for this visit.      Chief Complaint   Patient presents with     RECHECK     F/U on Heart Problems and PSVT. Patient wants also to F/U on his COPD     He presents with f/up of mult issues  Left hip feels great.  Can walk better, but then has to stop due to dyspnea.  Only about 25 steps.  Wonders if concentrator can work.   Does not have h/o current O2 use.  CAT 18 today.  Stomach bloated.  tums and alca selzer help.  omep not clearly helpful.  Has gained wt.  No daily etoh use.  No prioir ascites.  No constipation.  Has gained wt.  Uses ibuprofen rarely.      PMH, Medications and Allergies were reviewed and updated as needed.        REVIEW  "OF SYSTEMS     General: No fevers, chills, sweats, unexplained weight loss  CV: No chest pain, palpitations, orthopnea or pnd  Resp: No cough .    GI:  No nausea or vomiting. No constipation or diarrhea.  No blood in stool.            OBJECTIVE     Vitals:    01/23/20 0819   BP: (!) 148/74   Pulse: 79   Resp: 24   Temp: 97.7  F (36.5  C)   TempSrc: Oral   SpO2: 96%   Weight: 90.3 kg (199 lb)   Height: 1.71 m (5' 7.32\")     Body mass index is 30.87 kg/m .  Gen:  Well nourished and in NAD  Mildly cushingoid  Neck: supple without lymphadenopathy or thyromegaly  CV:  RRR  - no murmurs, rubs, or gallups,   Pulm:  CTAB, no wheezes/rales/rhonchi   ABD: Non-distended.  Normal BS.  Soft, nontender, no masses or HSM   Extrem: no cyanosis, no edema   Psych: alert, affect appropriate.    Neuro:  Non-focal    O2 sats reached ramsey of only 91% with ambulation.  Stopped due to dizziness    No results found for this or any previous visit (from the past 24 hour(s)).    Erwin Metz MD  "

## 2020-01-23 NOTE — LETTER
January 28, 2020      Mikhail Bernard  [address]    [facility] [id number]      Please see below for your test results.    Resulted Orders   CBC with Diff Plt ([facility])   Result Value Ref Range    WBC 10.6 4.0 - 11.0 K/uL    Lymphocytes # 0.9 0.8 - 5.3 K/uL    % Lymphocytes 8.9 (L) 20.0 - 48.0 %L    Mid # 0.6 0.0 - 2.2 K/uL    Mid % 5.5 0.0 - 20.0 %M    GRANULOCYTES # 9.1 (H) 1.6 - 8.3 K/uL    % Granulocytes 85.6 (H) 40.0 - 75.0 %G    RBC 4.4 (L) 4.4 - 5.9 M/uL    Hemoglobin 13.1 (L) 13.3 - 17.7 g/dL    Hematocrit 42.3 40.0 - 53.0 %    MCV 96.3 78.0 - 100.0 fL    MCH 29.8 26.5 - 35.0    MCHC 31.0 (L) 32.0 - 36.0 g/dL    Platelets 369.0 150.0 - 450.0 K/uL   Hepatic Panel ([place])   Result Value Ref Range    Albumin 4.7 3.3 - 4.9 mg/dL    Alkaline Phosphatase 59.8 40.0 - 150.0 U/L    ALT 19.8 0.0 - 45.0 U/L    AST 19.3 0.0 - 55.0 U/L    Bilirubin Direct 0.1 0.0 - 0.2 mg/dL    Bilirubin Total <0.3 (L) 0.2 - 1.3 mg/dL    Protein Total 7.8 6.8 - 8.8 g/dL                           Mr Bernard,    Your liver tests were normal.  Your CBC shows a slight anemia that hs not really changed much over the past 4 years.    There is no clear explanation from these blood tests on the cause of your stomach symptoms.  Let's see how the famotidine works, and then lets recheck this in February, as we discussed.    RAGHAVENDRA Metz

## 2020-01-28 NOTE — RESULT ENCOUNTER NOTE
Mr Bernard  Your liver tests were normal.  Your CBC shows a slight anemia that hs not really changed much over the past 4 years.    There is no clear explanation from these blood tests on the cause of your stomach symptoms.  Let's see how the famotidine works, and then lets recheck this in February, as we discussed.  RAGHAVENDRA Metz

## 2020-02-23 ENCOUNTER — HEALTH MAINTENANCE LETTER (OUTPATIENT)
Age: 70
End: 2020-02-23

## 2020-03-01 DIAGNOSIS — J44.9 CHRONIC OBSTRUCTIVE PULMONARY DISEASE, UNSPECIFIED COPD TYPE (H): ICD-10-CM

## 2020-03-02 RX ORDER — PREDNISONE 10 MG/1
10 TABLET ORAL DAILY
Qty: 90 TABLET | Refills: 0 | Status: SHIPPED | OUTPATIENT
Start: 2020-03-02 | End: 2020-05-26

## 2020-04-01 DIAGNOSIS — J44.9 CHRONIC OBSTRUCTIVE PULMONARY DISEASE, UNSPECIFIED COPD TYPE (H): ICD-10-CM

## 2020-04-01 RX ORDER — TIOTROPIUM BROMIDE 18 UG/1
CAPSULE ORAL; RESPIRATORY (INHALATION)
Qty: 30 CAPSULE | Refills: 1 | Status: SHIPPED | OUTPATIENT
Start: 2020-04-01

## 2020-04-01 RX ORDER — ALBUTEROL SULFATE 90 UG/1
AEROSOL, METERED RESPIRATORY (INHALATION)
Qty: 36 G | Refills: 10 | Status: SHIPPED | OUTPATIENT
Start: 2020-04-01

## 2020-05-25 DIAGNOSIS — J44.9 CHRONIC OBSTRUCTIVE PULMONARY DISEASE, UNSPECIFIED COPD TYPE (H): ICD-10-CM

## 2020-05-26 RX ORDER — PREDNISONE 10 MG/1
TABLET ORAL
Qty: 90 TABLET | Refills: 0 | Status: SHIPPED | OUTPATIENT
Start: 2020-05-26 | End: 2020-08-20

## 2020-07-22 DIAGNOSIS — J43.9 PULMONARY EMPHYSEMA, UNSPECIFIED EMPHYSEMA TYPE (H): ICD-10-CM

## 2020-07-23 RX ORDER — IPRATROPIUM BROMIDE AND ALBUTEROL SULFATE 2.5; .5 MG/3ML; MG/3ML
SOLUTION RESPIRATORY (INHALATION)
Qty: 360 ML | Refills: 3 | Status: SHIPPED | OUTPATIENT
Start: 2020-07-23 | End: 2021-01-21

## 2020-08-19 DIAGNOSIS — J44.9 CHRONIC OBSTRUCTIVE PULMONARY DISEASE, UNSPECIFIED COPD TYPE (H): ICD-10-CM

## 2020-08-20 RX ORDER — PREDNISONE 10 MG/1
TABLET ORAL
Qty: 90 TABLET | Refills: 0 | Status: SHIPPED | OUTPATIENT
Start: 2020-08-20 | End: 2020-11-09

## 2020-08-21 ENCOUNTER — TELEPHONE (OUTPATIENT)
Dept: FAMILY MEDICINE | Facility: CLINIC | Age: 70
End: 2020-08-21

## 2020-08-21 NOTE — TELEPHONE ENCOUNTER
[facility] phone call message- general phone call:    Reason for call: He would like to talk to a nurse re if he should get his flu shot this year.    Action desired: call back.    Return call needed: Yes    OK to leave a message on voice mail? Yes    Advised patient to response may take up to 2 business days: Yes    Primary language: English      needed? No    Call taken on August 21, 2020 at 10:39 AM by Jennifer Dash

## 2020-08-21 NOTE — TELEPHONE ENCOUNTER
Patient informed that at his age and his underlying condition it is recommended he have a flu shot    BTRN

## 2020-10-14 DIAGNOSIS — J44.1 COPD EXACERBATION (H): ICD-10-CM

## 2020-10-14 RX ORDER — PREDNISONE 20 MG/1
40 TABLET ORAL DAILY
Qty: 20 TABLET | Refills: 0 | Status: SHIPPED | OUTPATIENT
Start: 2020-10-14 | End: 2020-12-28

## 2020-11-09 DIAGNOSIS — J44.9 CHRONIC OBSTRUCTIVE PULMONARY DISEASE, UNSPECIFIED COPD TYPE (H): ICD-10-CM

## 2020-11-09 RX ORDER — PREDNISONE 10 MG/1
TABLET ORAL
Qty: 90 TABLET | Refills: 0 | Status: SHIPPED | OUTPATIENT
Start: 2020-11-09 | End: 2021-01-21

## 2020-11-19 DIAGNOSIS — I47.10 SVT (SUPRAVENTRICULAR TACHYCARDIA) (H): ICD-10-CM

## 2020-11-19 DIAGNOSIS — I10 ESSENTIAL HYPERTENSION: ICD-10-CM

## 2020-11-19 RX ORDER — METOPROLOL SUCCINATE 100 MG/1
100 TABLET, EXTENDED RELEASE ORAL DAILY
Qty: 30 TABLET | Refills: 11 | Status: SHIPPED | OUTPATIENT
Start: 2020-11-19

## 2020-11-23 DIAGNOSIS — J44.9 CHRONIC OBSTRUCTIVE PULMONARY DISEASE, UNSPECIFIED COPD TYPE (H): ICD-10-CM

## 2020-11-23 RX ORDER — FLUTICASONE PROPIONATE AND SALMETEROL XINAFOATE 230; 21 UG/1; UG/1
AEROSOL, METERED RESPIRATORY (INHALATION)
Qty: 36 G | Refills: 11 | Status: SHIPPED | OUTPATIENT
Start: 2020-11-23

## 2020-12-06 ENCOUNTER — HEALTH MAINTENANCE LETTER (OUTPATIENT)
Age: 70
End: 2020-12-06

## 2020-12-27 DIAGNOSIS — J44.1 COPD EXACERBATION (H): ICD-10-CM

## 2020-12-28 RX ORDER — PREDNISONE 20 MG/1
40 TABLET ORAL DAILY
Qty: 20 TABLET | Refills: 0 | Status: SHIPPED | OUTPATIENT
Start: 2020-12-28 | End: 2021-02-25

## 2021-01-21 DIAGNOSIS — J43.9 PULMONARY EMPHYSEMA, UNSPECIFIED EMPHYSEMA TYPE (H): ICD-10-CM

## 2021-01-21 DIAGNOSIS — J44.9 CHRONIC OBSTRUCTIVE PULMONARY DISEASE, UNSPECIFIED COPD TYPE (H): ICD-10-CM

## 2021-01-21 RX ORDER — IPRATROPIUM BROMIDE AND ALBUTEROL SULFATE 2.5; .5 MG/3ML; MG/3ML
SOLUTION RESPIRATORY (INHALATION)
Qty: 360 ML | Refills: 0 | Status: SHIPPED | OUTPATIENT
Start: 2021-01-21 | End: 2021-02-18

## 2021-01-21 RX ORDER — PREDNISONE 10 MG/1
TABLET ORAL
Qty: 90 TABLET | Refills: 0 | Status: SHIPPED | OUTPATIENT
Start: 2021-01-21

## 2021-01-21 NOTE — TELEPHONE ENCOUNTER
Please call:  I refilled his meds.  Can we set up at minimum a phone visit in next 2-3 months for a med check?

## 2021-02-18 DIAGNOSIS — J43.9 PULMONARY EMPHYSEMA, UNSPECIFIED EMPHYSEMA TYPE (H): ICD-10-CM

## 2021-02-18 RX ORDER — IPRATROPIUM BROMIDE AND ALBUTEROL SULFATE 2.5; .5 MG/3ML; MG/3ML
SOLUTION RESPIRATORY (INHALATION)
Qty: 360 ML | Refills: 0 | Status: SHIPPED | OUTPATIENT
Start: 2021-02-18

## 2021-02-24 DIAGNOSIS — J44.1 COPD EXACERBATION (H): ICD-10-CM

## 2021-02-25 RX ORDER — PREDNISONE 20 MG/1
40 TABLET ORAL DAILY
Qty: 20 TABLET | Refills: 0 | Status: SHIPPED | OUTPATIENT
Start: 2021-02-25

## 2021-04-11 ENCOUNTER — HEALTH MAINTENANCE LETTER (OUTPATIENT)
Age: 71
End: 2021-04-11

## 2021-05-31 ENCOUNTER — RECORDS - HEALTHEAST (OUTPATIENT)
Dept: ADMINISTRATIVE | Facility: CLINIC | Age: 71
End: 2021-05-31

## 2021-09-26 ENCOUNTER — HEALTH MAINTENANCE LETTER (OUTPATIENT)
Age: 71
End: 2021-09-26

## 2022-05-07 ENCOUNTER — HEALTH MAINTENANCE LETTER (OUTPATIENT)
Age: 72
End: 2022-05-07

## 2022-09-13 NOTE — TELEPHONE ENCOUNTER
Re:  The tramadol refill  Please call this patient--has this been effective for him?  Also, when is his appointment with ortho?  I need this info in order to refill the tramadol?  Thanks  CF   [de-identified] : 22 year old male  (LHD,  - helps ta`ke care of diabled people)   left knee pain. Started 9/4/22 suddenly \par The pain is located anteriorly , medial and deep\par The pain is associated with buckling , catching and locking\par Worse with activity, walking, running, and better at rest.\par Has tried Modified activity and partial WB w/ crutches\par \par 9/13/22 - has been mod activity, still pain and [id number], had mri showing OCD

## 2023-04-03 NOTE — RESULT ENCOUNTER NOTE
Please fax to surgery center--[facility].    Mr Bernard  Your labs are acceptable.   Your kidney function and blood salts are normal.  Your CBC shows a mild increase WBC, which is a likely from steroids, otherwise if normal.  Good luck with surgery!  RAGHAVENDRA Metz  
Please mail copy of EKG to [facility], too.    EKG Interpretation  Indication:Pre op evaluation and Hypertension    Interpretation: Normal Sinus Rhythm, rate nl , Left axis deviation.  RBBB.  No change from EKG in 2017.    Patient informed at visit.
MED

## 2023-04-23 ENCOUNTER — HEALTH MAINTENANCE LETTER (OUTPATIENT)
Age: 73
End: 2023-04-23

## 2023-12-02 ENCOUNTER — HEALTH MAINTENANCE LETTER (OUTPATIENT)
Age: 73
End: 2023-12-02

## 2024-02-07 ENCOUNTER — TRANSCRIBE ORDERS (OUTPATIENT)
Dept: OTHER | Age: 74
End: 2024-02-07

## 2024-02-07 DIAGNOSIS — I50.22 CHRONIC SYSTOLIC CONGESTIVE HEART FAILURE (H): Primary | ICD-10-CM
